# Patient Record
Sex: FEMALE | Race: WHITE | Employment: OTHER | URBAN - METROPOLITAN AREA
[De-identification: names, ages, dates, MRNs, and addresses within clinical notes are randomized per-mention and may not be internally consistent; named-entity substitution may affect disease eponyms.]

---

## 2017-02-16 PROBLEM — Z79.01 CHRONIC ANTICOAGULATION: Status: ACTIVE | Noted: 2017-02-16

## 2017-02-16 PROBLEM — I48.0 PAF (PAROXYSMAL ATRIAL FIBRILLATION) (HCC): Status: ACTIVE | Noted: 2017-02-16

## 2017-02-16 PROBLEM — R55 VASOVAGAL SYNCOPE: Status: ACTIVE | Noted: 2017-02-16

## 2017-05-31 PROBLEM — M70.50 PES ANSERINE BURSITIS: Status: ACTIVE | Noted: 2017-05-31

## 2018-04-16 RX ORDER — ATENOLOL 25 MG/1
25 TABLET ORAL DAILY
Qty: 90 TABLET | Refills: 3 | Status: SHIPPED | OUTPATIENT
Start: 2018-04-16 | End: 2019-04-11 | Stop reason: SDUPTHER

## 2018-07-16 ENCOUNTER — OFFICE VISIT (OUTPATIENT)
Dept: CARDIOLOGY CLINIC | Age: 73
End: 2018-07-16
Payer: MEDICARE

## 2018-07-16 VITALS
SYSTOLIC BLOOD PRESSURE: 124 MMHG | RESPIRATION RATE: 16 BRPM | DIASTOLIC BLOOD PRESSURE: 82 MMHG | BODY MASS INDEX: 29.84 KG/M2 | HEART RATE: 71 BPM | WEIGHT: 152 LBS | HEIGHT: 60 IN

## 2018-07-16 DIAGNOSIS — I45.10 RBBB: Primary | ICD-10-CM

## 2018-07-16 DIAGNOSIS — I48.0 PAROXYSMAL ATRIAL FIBRILLATION (HCC): ICD-10-CM

## 2018-07-16 PROCEDURE — 1090F PRES/ABSN URINE INCON ASSESS: CPT | Performed by: INTERNAL MEDICINE

## 2018-07-16 PROCEDURE — 99213 OFFICE O/P EST LOW 20 MIN: CPT | Performed by: INTERNAL MEDICINE

## 2018-07-16 PROCEDURE — 93000 ELECTROCARDIOGRAM COMPLETE: CPT | Performed by: INTERNAL MEDICINE

## 2018-07-16 PROCEDURE — 4040F PNEUMOC VAC/ADMIN/RCVD: CPT | Performed by: INTERNAL MEDICINE

## 2018-07-16 PROCEDURE — 1036F TOBACCO NON-USER: CPT | Performed by: INTERNAL MEDICINE

## 2018-07-16 PROCEDURE — G8400 PT W/DXA NO RESULTS DOC: HCPCS | Performed by: INTERNAL MEDICINE

## 2018-07-16 PROCEDURE — 3017F COLORECTAL CA SCREEN DOC REV: CPT | Performed by: INTERNAL MEDICINE

## 2018-07-16 PROCEDURE — 1101F PT FALLS ASSESS-DOCD LE1/YR: CPT | Performed by: INTERNAL MEDICINE

## 2018-07-16 PROCEDURE — G8427 DOCREV CUR MEDS BY ELIG CLIN: HCPCS | Performed by: INTERNAL MEDICINE

## 2018-07-16 PROCEDURE — 1123F ACP DISCUSS/DSCN MKR DOCD: CPT | Performed by: INTERNAL MEDICINE

## 2018-07-16 PROCEDURE — G8419 CALC BMI OUT NRM PARAM NOF/U: HCPCS | Performed by: INTERNAL MEDICINE

## 2018-07-16 NOTE — PROGRESS NOTES
[facility]  Matty Andrew M.D., MANDY Marie. Carmelo Duke M.D., MANDY Wolfe M.D. Thanh Celaya M.D. Elvira Carrasquillo M.D. Veronica Dance, D.O. Chevis Lolling, M.D.  Jeannine Garcia. Bernard Turner Miquel   1945  Pema Andrews MD      Johnny Lafleur was seen in our [facility] office today, 2018, for follow up of her paroxysmal atrial fibrillation. She is a 57-year-old woman who does have a history of paroxysmal supraventricular tachycardia and RBBB. While visiting in [place] in 10/2016, she apparently had bronchitis and developed a very serious cough. She was hospitalized and found to be in atrial fibrillation with a rapid ventricular response. She converted to sinus rhythm and was anticoagulated. The cough eventually did resolve. Recently she has been clinically stable. She denies any chest pain or palpitations. She denies lightheadedness or syncope. The patient has no cardiac complaints at this time. She states that she does fatigue easily but believes that it is related to her age. Past Medical History:  1. Palpitations and PSVT. 2. RBBB. 3. Hypothyroidism. 4. Hysterectomy. 5. Ovarian cyst surgery, .  6. Varicose veins  7. Liposuction of upper arms and calves bilaterally. 8. Strong family history of AHD. Both parents  of MI in their 62s. 9. Hyperlipidemia. 10. Stress echo, . Normal.  11. Exercise MPS, 10/04/2007. Augusto protocol, seven minutes 45 seconds, 87% MPHR. No chest pain. Normal perfusion, ED 78%. No TID. No ischemia. 12. Exercise MPS, 2012.  10.4 METS, 88% MPHR, no TID, EF 83%. Small area distal anteroseptal and apical ischemia along with small to moderate sized area of inferolateral ischemia suggestive of two vessel CAD and intermediate risk for hard cardiac events. 13. Lifetime nonsmoker. 14. No drug allergies.   She has been intolerant to high dose aspirin. 15. Obesity. BMI 31.5 - 11/2016. 16. Exercise MPS, 12/18/2013. No chest pain, 7 METS, 86% MPHR. Baseline RBBB with 1 mm upsloping inferior ST depression borderline for ischemia. Isolated PACs. Normal wall motion, EF 84%. Normal perfusion, no ischemia, infarction or TID. 16. Admission [facility] in [place], 10/18/2016 with palpitations. AF/RVR. Rx with IV Cardizem and Lovenox, and discharged with rate control on Eliquis and higher doses of atenolol. 18. OP cardiac follow up, 11/09/2016. Patient stopped Eliquis without medical advice. 19. Labs 11/09/2016. TSH 3.4. Free T4 7.0. PRO-. The PRO-BNP is not consistent with decompensated CHF based on age-adjusted normals and a 90% cut value. 20. OP follow up, 11/23/2016. Patient in sinus rhythm. 21. Hospitalization 02/16/2017. Vasovagal syncope. Review of Systems:  HEENT: negative for acute visual and auditory problems  Constitutional: negative for fever and chills  Respiratory: negative for cough and hemoptysis  Cardiovascular: negative for chest pain and dyspnea  Gastrointestinal: negative for abdominal pain, diarrhea, nausea and vomiting  Genitourinary: negative for dysuria and hematuria  Derm: negative for rash and skin lesion(s)  Neurological: negative for seizures and tremors  Endocrine: negative for diabetic symptoms including polydipsia and polyuria  Musculoskeletal: negative for CTD  Psychiatric: negative for anxiety and major depression. The remainder of the review of systems is negative except as noted above. On exam today, she is a well-nourished white female who is awake, alert and oriented. She looks younger than her stated age. Her pulse is 71 and regular. Her blood pressure is 124/82. She weighs 152 pounds. BMI is 29.7. HEENT:  Normocephalic and atraumatic. EOMI. Sclerae are clear. Pupils are equal, round and react to light. The oral mucosa is moist.  Tongue is midline.   Her neck is supple. She has no jugular distention. Her carotids are full without bruits. She has no neck or supraclavicular masses. No thyromegaly. Respirations are unlabored. Her chest is clear to auscultation and percussion. She has no presacral edema and no chest wall tenderness. Her heart has a regular rhythm with an S4 gallop, but no S3 or murmur. The PMI is not displaced. She has no precordial heave, lift or thrill. Her abdomen is soft and normally active without masses, organomegaly or bruits. Her extremities show no edema. She does have \"heavy ankles\" but there is no pitting. Pedal pulses are easily palpated in the feet bilaterally. There are no obvious skin rashes. I personally reviewed her electrocardiogram.  It showed sinus rhythm and a RBBB, unchanged from 03/03/2017. Despite her initial reluctance to take anticoagulants, she has been on apixaban (Eliquis) for some time now and tells me that she does take is faithfully twice a day. At the end of the visit, her medications were:  atenolol (TENORMIN) 25 MG tablet Take 1 tablet by mouth daily   apixaban (ELIQUIS) 2.5 MG TABS tablet Take 1 tablet by mouth 2 times daily   calcium carbonate 600 MG TABS tablet Take 1 tablet by mouth daily   diclofenac (VOLTAREN) 50 MG EC tablet Take 50 mg by mouth 3 times daily as needed for Pain (inflammation)   temazepam (RESTORIL) 15 MG capsule Take by mouth 1/2 tablet nightly   levothyroxine (SYNTHROID) 88 MCG tablet Take 88 mcg by mouth Daily. As long as she is doing well, I am not making any changes in her management and no further cardiac workup is indicated at this time. We will continue to see her yearly but would be happy to see her sooner if there is a change in her status. I thank you, Dr. Reed Paulino, for asking our advice regarding her care.         DAB/aed  [id number]

## 2018-07-24 ENCOUNTER — OFFICE VISIT (OUTPATIENT)
Dept: ORTHOPEDIC SURGERY | Age: 73
End: 2018-07-24
Payer: MEDICARE

## 2018-07-24 VITALS
DIASTOLIC BLOOD PRESSURE: 80 MMHG | WEIGHT: 145 LBS | HEART RATE: 66 BPM | HEIGHT: 60 IN | TEMPERATURE: 97.6 F | SYSTOLIC BLOOD PRESSURE: 129 MMHG | BODY MASS INDEX: 28.47 KG/M2

## 2018-07-24 DIAGNOSIS — M25.562 CHRONIC PAIN OF BOTH KNEES: Primary | ICD-10-CM

## 2018-07-24 DIAGNOSIS — M25.561 CHRONIC PAIN OF BOTH KNEES: Primary | ICD-10-CM

## 2018-07-24 DIAGNOSIS — G89.29 CHRONIC PAIN OF BOTH KNEES: Primary | ICD-10-CM

## 2018-07-24 DIAGNOSIS — M70.50 PES ANSERINE BURSITIS: ICD-10-CM

## 2018-07-24 PROCEDURE — 3017F COLORECTAL CA SCREEN DOC REV: CPT | Performed by: ORTHOPAEDIC SURGERY

## 2018-07-24 PROCEDURE — G8427 DOCREV CUR MEDS BY ELIG CLIN: HCPCS | Performed by: ORTHOPAEDIC SURGERY

## 2018-07-24 PROCEDURE — 1090F PRES/ABSN URINE INCON ASSESS: CPT | Performed by: ORTHOPAEDIC SURGERY

## 2018-07-24 PROCEDURE — 99213 OFFICE O/P EST LOW 20 MIN: CPT | Performed by: ORTHOPAEDIC SURGERY

## 2018-07-24 PROCEDURE — G8419 CALC BMI OUT NRM PARAM NOF/U: HCPCS | Performed by: ORTHOPAEDIC SURGERY

## 2018-07-24 PROCEDURE — 1123F ACP DISCUSS/DSCN MKR DOCD: CPT | Performed by: ORTHOPAEDIC SURGERY

## 2018-07-24 PROCEDURE — 4040F PNEUMOC VAC/ADMIN/RCVD: CPT | Performed by: ORTHOPAEDIC SURGERY

## 2018-07-24 PROCEDURE — G8400 PT W/DXA NO RESULTS DOC: HCPCS | Performed by: ORTHOPAEDIC SURGERY

## 2018-07-24 PROCEDURE — 1036F TOBACCO NON-USER: CPT | Performed by: ORTHOPAEDIC SURGERY

## 2018-07-24 PROCEDURE — 1101F PT FALLS ASSESS-DOCD LE1/YR: CPT | Performed by: ORTHOPAEDIC SURGERY

## 2019-04-11 RX ORDER — ATENOLOL 25 MG/1
25 TABLET ORAL DAILY
Qty: 90 TABLET | Refills: 3 | Status: SHIPPED
Start: 2019-04-11 | End: 2020-03-13 | Stop reason: SDUPTHER

## 2019-08-12 ENCOUNTER — OFFICE VISIT (OUTPATIENT)
Dept: CARDIOLOGY CLINIC | Age: 74
End: 2019-08-12
Payer: MEDICARE

## 2019-08-12 VITALS
BODY MASS INDEX: 29.35 KG/M2 | HEIGHT: 60 IN | HEART RATE: 70 BPM | DIASTOLIC BLOOD PRESSURE: 78 MMHG | RESPIRATION RATE: 16 BRPM | WEIGHT: 149.5 LBS | SYSTOLIC BLOOD PRESSURE: 128 MMHG

## 2019-08-12 DIAGNOSIS — I48.0 PAROXYSMAL ATRIAL FIBRILLATION (HCC): Primary | ICD-10-CM

## 2019-08-12 PROCEDURE — G8427 DOCREV CUR MEDS BY ELIG CLIN: HCPCS | Performed by: INTERNAL MEDICINE

## 2019-08-12 PROCEDURE — 1036F TOBACCO NON-USER: CPT | Performed by: INTERNAL MEDICINE

## 2019-08-12 PROCEDURE — 93000 ELECTROCARDIOGRAM COMPLETE: CPT | Performed by: INTERNAL MEDICINE

## 2019-08-12 PROCEDURE — 99214 OFFICE O/P EST MOD 30 MIN: CPT | Performed by: INTERNAL MEDICINE

## 2019-08-12 PROCEDURE — 4040F PNEUMOC VAC/ADMIN/RCVD: CPT | Performed by: INTERNAL MEDICINE

## 2019-08-12 PROCEDURE — 1090F PRES/ABSN URINE INCON ASSESS: CPT | Performed by: INTERNAL MEDICINE

## 2019-08-12 PROCEDURE — G8419 CALC BMI OUT NRM PARAM NOF/U: HCPCS | Performed by: INTERNAL MEDICINE

## 2019-08-12 PROCEDURE — G8400 PT W/DXA NO RESULTS DOC: HCPCS | Performed by: INTERNAL MEDICINE

## 2019-08-12 PROCEDURE — 1123F ACP DISCUSS/DSCN MKR DOCD: CPT | Performed by: INTERNAL MEDICINE

## 2019-08-12 NOTE — PROGRESS NOTES
Peace Burnsivette  1945  Date of Service: 8/12/2019    Patient Active Problem List    Diagnosis Date Noted    Abnormal nuclear stress test 12/13/2013     Priority: Medium    History of PSVT (paroxysmal supraventricular tachycardia)      Priority: Medium    Hyperlipidemia      Priority: Medium    Hypothyroidism 12/13/2013     Priority: Low    Family history of premature CAD 12/13/2013     Priority: Low     Overview Note:     1. Both parents had MI in their 63's      Obesity (BMI 30.0-34.9) 12/13/2013     Priority: Low    RBBB      Priority: Low    Pes anserine bursitis 05/31/2017    Paroxysmal atrial fibrillation ([facility] [address] 02/16/2017    Vasovagal syncope 02/16/2017    Chronic anticoagulation 02/16/2017       Social History     Socioeconomic History    Marital status:       Spouse name: None    Number of children: None    Years of education: None    Highest education level: None   Occupational History    None   Social Needs    Financial resource strain: None    Food insecurity:     Worry: None     Inability: None    Transportation needs:     Medical: None     Non-medical: None   Tobacco Use    Smoking status: Never Smoker    Smokeless tobacco: Never Used   Substance and Sexual Activity    Alcohol use: No    Drug use: No    Sexual activity: None   Lifestyle    Physical activity:     Days per week: None     Minutes per session: None    Stress: None   Relationships    Social connections:     Talks on phone: None     Gets together: None     Attends Yazidi service: None     Active member of club or organization: None     Attends meetings of clubs or organizations: None     Relationship status: None    Intimate partner violence:     Fear of current or ex partner: None     Emotionally abused: None     Physically abused: None     Forced sexual activity: None   Other Topics Concern    None   Social History Narrative    None       Current Outpatient Medications   Medication Sig murmur. LUNGS:  Clear to auscultation bilaterally. No use of accessory muscles. symmetrical excursion. ABDOMEN:  Soft, non-tender. Normal bowel sounds. EXTREMITIES:  Full ROM x 4. Very thick lower extremities with mild to moderate bilateral lower extremity edema. Good distal pulses. EYES:  Extraocular muscles intact. PERRL. Normal lids & conjunctiva. ENT:  Nares are clear & not bleeding. Moist mucosa. Normal lips formation. No external masses   NEURO: no tremors, full ROM x 4, EOMI. SKIN:  Warm, dry and intact. Normal turgor. EKG: Sinus rhythm, 70 bpm, nl axis, nonspecific ST - T wave changes. Assessment:   1. Paroxysmal atrial fibrillation. maintaining sinus rhythm at this time. 2. Also reported prior history of PSVT. 3. Chronic right bundle branch block. 4. Chronic lower extremity swelling and edema. Possible lymphedema but she states that she has never been diagnosed with this. 5. Hypercholesterolemia        Recommendations:  1. Continue the atenolol  2. She is less than [de-identified]years old, greater than 60 kg. Her last creatinine that I have available is normal.  Given all of these she should be on full dose Eliquis. Therefore I will increase her from 2.5 mg that she has been taking up to 5 mg twice daily. She denies any falls or bleeding issues. Thank you for allowing me to participate in your patient's care.       [address], [address] [facility]  Interventional Cardiology

## 2020-02-17 ENCOUNTER — TELEPHONE (OUTPATIENT)
Dept: CARDIOLOGY CLINIC | Age: 75
End: 2020-02-17

## 2020-03-13 RX ORDER — ATENOLOL 25 MG/1
25 TABLET ORAL DAILY
Qty: 90 TABLET | Refills: 3 | Status: SHIPPED
Start: 2020-03-13 | End: 2021-03-22

## 2020-07-23 ENCOUNTER — OFFICE VISIT (OUTPATIENT)
Dept: ORTHOPEDIC SURGERY | Age: 75
End: 2020-07-23
Payer: MEDICARE

## 2020-07-23 VITALS — BODY MASS INDEX: 29.25 KG/M2 | TEMPERATURE: 98.1 F | HEIGHT: 60 IN | WEIGHT: 149 LBS

## 2020-07-23 PROCEDURE — 1090F PRES/ABSN URINE INCON ASSESS: CPT | Performed by: ORTHOPAEDIC SURGERY

## 2020-07-23 PROCEDURE — 1123F ACP DISCUSS/DSCN MKR DOCD: CPT | Performed by: ORTHOPAEDIC SURGERY

## 2020-07-23 PROCEDURE — G8417 CALC BMI ABV UP PARAM F/U: HCPCS | Performed by: ORTHOPAEDIC SURGERY

## 2020-07-23 PROCEDURE — 99213 OFFICE O/P EST LOW 20 MIN: CPT | Performed by: ORTHOPAEDIC SURGERY

## 2020-07-23 PROCEDURE — 1036F TOBACCO NON-USER: CPT | Performed by: ORTHOPAEDIC SURGERY

## 2020-07-23 PROCEDURE — G8427 DOCREV CUR MEDS BY ELIG CLIN: HCPCS | Performed by: ORTHOPAEDIC SURGERY

## 2020-07-23 PROCEDURE — 4040F PNEUMOC VAC/ADMIN/RCVD: CPT | Performed by: ORTHOPAEDIC SURGERY

## 2020-07-23 PROCEDURE — G8400 PT W/DXA NO RESULTS DOC: HCPCS | Performed by: ORTHOPAEDIC SURGERY

## 2020-07-23 RX ORDER — TEMAZEPAM 7.5 MG/1
7.5 CAPSULE ORAL NIGHTLY PRN
COMMUNITY
Start: 2020-06-12 | End: 2022-05-12

## 2020-07-24 NOTE — PROGRESS NOTES
Chief Complaint:   Chief Complaint   Patient presents with    Foot Problem     Bunion Lt foot. Hx right bunionectomy 2010       Reese Pace presents with pain and deformity of the left foot, no history of trauma, has a known bunion deformity which she feels has progressed, now interfering with shoe wear and weightbearing activities, giving her a feeling of imbalance. History notable for previous right forefoot corrective surgery in the remote past including hallux valgus correction as well as some correction of the lesser toes according to the patient. Doing well in that regard and pleased with that result. No other joint complaints at this time. Allergies; medications; past medical, surgical, family, and social history; and problem list have been reviewed today and updated as indicated in this encounter seen below. Exam: Lower extremity exam shows good motion of both hips without pain, knees are straight and stable without laxity deformity or effusion. Right foot shows mild persistent hallux valgus deformity that is asymptomatic, lesser toes are well aligned, there is a hard callus beneath the second metatarsal head that is nontender nonerythematous. Left foot shows a severe but passively correctable hallux valgus deformity, also correctable valgus alignment of the lesser toes at the MP levels. Also a hard callus which is nontender beneath the left second metatarsal head. Radiographs: Three-view x-rays of left foot obtained today, there is significant hallux valgus deformity and angle of over 50 degrees, intermetatarsal angle between 1 and 2 is 15 degrees, mild degenerative changes noted at the first MTP. Daisy Lewis was seen today for foot problem.     Diagnoses and all orders for this visit:    Bunion of left foot  -     XR FOOT LEFT (MIN 3 VIEWS)  -     Elvira Macias MD, Orthopaedics, [place] ([facility])       Treatment options were reviewed, the patient is having significant problems that are not addressed with activity modification or shoewear, she has done well with previous forefoot reconstructive surgery on the right. I advised her that surgery of this magnitude is beyond the scope of my practice and therefore suggested consultation with Dr. Ivis Lauren in this regard for possible surgical invention. Questions asked and answered follow-up as needed. - Counseling More Than 50% of the Appointment Time: 15 minutes    Return if symptoms worsen or fail to improve. Current Outpatient Medications   Medication Sig Dispense Refill    temazepam (RESTORIL) 7.5 MG capsule Take 7.5 mg by mouth nightly as needed.  atenolol (TENORMIN) 25 MG tablet Take 1 tablet by mouth daily 90 tablet 3    apixaban (ELIQUIS) 5 MG TABS tablet Take 1 tablet by mouth 2 times daily 180 tablet 3    calcium carbonate 600 MG TABS tablet Take 1 tablet by mouth daily      levothyroxine (SYNTHROID) 88 MCG tablet Take 88 mcg by mouth Daily.  diclofenac (VOLTAREN) 50 MG EC tablet Take 50 mg by mouth 3 times daily as needed for Pain (inflammation)       No current facility-administered medications for this visit. Patient Active Problem List   Diagnosis    History of PSVT (paroxysmal supraventricular tachycardia)    RBBB    Hyperlipidemia    Hypothyroidism    Family history of premature CAD    Abnormal nuclear stress test    Obesity (BMI 30.0-34. 9)    Paroxysmal atrial fibrillation (HCC)    Vasovagal syncope    Chronic anticoagulation    Pes anserine bursitis       Past Medical History:   Diagnosis Date    Family history of coronary artery disease     History of PSVT (paroxysmal supraventricular tachycardia)     Hyperlipidemia     Hypothyroidism     RBBB        Past Surgical History:   Procedure Laterality Date    CHOLECYSTECTOMY      COLONOSCOPY  12/2014    [address],     CYST REMOVAL  1995    HYSTERECTOMY      KNEE ARTHROSCOPY Left 05/08/2015    Arthroscopic medial and lateral meniscectomies, medial chondroplasty BARTOLO Morales MD    LIPOSUCTION      upper arms & calves bilaterally    TONSILLECTOMY         No Known Allergies    Social History     Socioeconomic History    Marital status:      Spouse name: None    Number of children: None    Years of education: None    Highest education level: None   Occupational History    None   Social Needs    Financial resource strain: None    Food insecurity     Worry: None     Inability: None    Transportation needs     Medical: None     Non-medical: None   Tobacco Use    Smoking status: Never Smoker    Smokeless tobacco: Never Used   Substance and Sexual Activity    Alcohol use: No    Drug use: No    Sexual activity: None   Lifestyle    Physical activity     Days per week: None     Minutes per session: None    Stress: None   Relationships    Social connections     Talks on phone: None     Gets together: None     Attends Anabaptist service: None     Active member of club or organization: None     Attends meetings of clubs or organizations: None     Relationship status: None    Intimate partner violence     Fear of current or ex partner: None     Emotionally abused: None     Physically abused: None     Forced sexual activity: None   Other Topics Concern    None   Social History Narrative    None       Family History   Problem Relation Age of Onset    Heart Surgery Mother     Heart Surgery Father          Review of Systems  As follows except as previously noted in HPI:  Constitutional: Negative for chills, diaphoresis, fatigue, fever and unexpected weight change. Respiratory: Negative for cough, shortness of breath and wheezing. Cardiovascular: Negative for chest pain and palpitations. Neurological: Negative for dizziness, syncope, cephalgia. GI / : negative  Musculoskeletal: see HPI       Objective:   Physical Exam   Constitutional: Oriented to person, place, and time.  and appears well-developed and well-nourished. :   Head: Normocephalic and atraumatic. Eyes: EOM are normal.   Neck: Neck supple. Cardiovascular: Normal rate and regular rhythm. Pulmonary/Chest: Effort normal. No stridor. No respiratory distress, no wheezes. Abdominal:  No abnormal distension. Neurological: Alert and oriented to person, place, and time. Skin: Skin is warm and dry. Psychiatric: Normal mood and affect.  Behavior is normal. Thought content normal.    7/24/2020  10:51 AM

## 2020-10-20 ENCOUNTER — OFFICE VISIT (OUTPATIENT)
Dept: CARDIOLOGY CLINIC | Age: 75
End: 2020-10-20
Payer: MEDICARE

## 2020-10-20 VITALS
RESPIRATION RATE: 16 BRPM | HEIGHT: 60 IN | DIASTOLIC BLOOD PRESSURE: 72 MMHG | BODY MASS INDEX: 30.57 KG/M2 | WEIGHT: 155.7 LBS | SYSTOLIC BLOOD PRESSURE: 120 MMHG | HEART RATE: 83 BPM

## 2020-10-20 PROCEDURE — 1123F ACP DISCUSS/DSCN MKR DOCD: CPT | Performed by: INTERNAL MEDICINE

## 2020-10-20 PROCEDURE — 1036F TOBACCO NON-USER: CPT | Performed by: INTERNAL MEDICINE

## 2020-10-20 PROCEDURE — G8400 PT W/DXA NO RESULTS DOC: HCPCS | Performed by: INTERNAL MEDICINE

## 2020-10-20 PROCEDURE — 93000 ELECTROCARDIOGRAM COMPLETE: CPT | Performed by: INTERNAL MEDICINE

## 2020-10-20 PROCEDURE — 1090F PRES/ABSN URINE INCON ASSESS: CPT | Performed by: INTERNAL MEDICINE

## 2020-10-20 PROCEDURE — 99213 OFFICE O/P EST LOW 20 MIN: CPT | Performed by: INTERNAL MEDICINE

## 2020-10-20 PROCEDURE — G8484 FLU IMMUNIZE NO ADMIN: HCPCS | Performed by: INTERNAL MEDICINE

## 2020-10-20 PROCEDURE — G8427 DOCREV CUR MEDS BY ELIG CLIN: HCPCS | Performed by: INTERNAL MEDICINE

## 2020-10-20 PROCEDURE — G8417 CALC BMI ABV UP PARAM F/U: HCPCS | Performed by: INTERNAL MEDICINE

## 2020-10-20 PROCEDURE — 4040F PNEUMOC VAC/ADMIN/RCVD: CPT | Performed by: INTERNAL MEDICINE

## 2020-10-20 NOTE — PROGRESS NOTES
Marc Blinks  1945  Date of Service: 10/20/2020    Patient Active Problem List    Diagnosis Date Noted    Abnormal nuclear stress test 12/13/2013     Priority: Medium    History of PSVT (paroxysmal supraventricular tachycardia)      Priority: Medium    Hyperlipidemia      Priority: Medium    Hypothyroidism 12/13/2013     Priority: Low    Family history of premature CAD 12/13/2013     Priority: Low     Overview Note:     1. Both parents had MI in their 63's      Obesity (BMI 30.0-34.9) 12/13/2013     Priority: Low    RBBB      Priority: Low    Pes anserine bursitis 05/31/2017    Paroxysmal atrial fibrillation ([facility] [address] 02/16/2017    Vasovagal syncope 02/16/2017    Chronic anticoagulation 02/16/2017       Social History     Socioeconomic History    Marital status:       Spouse name: None    Number of children: None    Years of education: None    Highest education level: None   Occupational History    None   Social Needs    Financial resource strain: None    Food insecurity     Worry: None     Inability: None    Transportation needs     Medical: None     Non-medical: None   Tobacco Use    Smoking status: Never Smoker    Smokeless tobacco: Never Used   Substance and Sexual Activity    Alcohol use: No    Drug use: No    Sexual activity: None   Lifestyle    Physical activity     Days per week: None     Minutes per session: None    Stress: None   Relationships    Social connections     Talks on phone: None     Gets together: None     Attends Amish service: None     Active member of club or organization: None     Attends meetings of clubs or organizations: None     Relationship status: None    Intimate partner violence     Fear of current or ex partner: None     Emotionally abused: None     Physically abused: None     Forced sexual activity: None   Other Topics Concern    None   Social History Narrative    None       Current Outpatient Medications   Medication Sig Dispense Refill    temazepam (RESTORIL) 7.5 MG capsule Take 7.5 mg by mouth nightly as needed.  atenolol (TENORMIN) 25 MG tablet Take 1 tablet by mouth daily 90 tablet 3    apixaban (ELIQUIS) 5 MG TABS tablet Take 1 tablet by mouth 2 times daily 180 tablet 3    calcium carbonate 600 MG TABS tablet Take 1 tablet by mouth daily      diclofenac (VOLTAREN) 50 MG EC tablet Take 50 mg by mouth 3 times daily as needed for Pain (inflammation)      levothyroxine (SYNTHROID) 88 MCG tablet Take 88 mcg by mouth Daily. No current facility-administered medications for this visit. No Known Allergies    Chief Complaint:  Marquis Lambert is here today for follow up and management/recomendations for PAF     History of Present Illness: Marquis Lambert states that She does house work, goes up the stairs, & goes shopping. She denies any chest discomfort, dyspnea on exertion, orthopnea/PND. She states that she has chronic lower extremity edema/swelling thickness that is unchanged. She denies any palpitations or presyncopal symptoms. REVIEW OF SYSTEMS:  As above. Patient does not complain of any fever, chills, nausea, vomiting or diarrhea. No focal, motor or neurological deficits. No changes in his/her vision, hearing, bowel or bladder habits. She is not known to have a history of thyroid problems. No recent nose bleeds. PHYSICAL EXAM:  Vitals:    10/20/20 1343   BP: 120/72   Pulse: 83   Resp: 16   Weight: 155 lb 11.2 oz (70.6 kg)   Height: 5' (1.524 m)       GENERAL:  She is alert and oriented x 3, communicates well, in no distress. NECK:  No masses, trachea is mid position. Supple, full ROM, no JVD or bruits. No palpable thyromegaly or lymphadenopathy. HEART:  Regular rate and rhythm. Normal S1 and S2. There is an S4 gallop and a I/VI (normal physiologic) systolic murmur. LUNGS:  Clear to auscultation bilaterally. No use of accessory muscles. symmetrical excursion. Poor effort.   ABDOMEN:  Soft, non-tender. Normal bowel sounds. Obese. EXTREMITIES:  Full ROM x 4. Very thick lower extremities but no bilateral lower extremity pitting edema. Good distal pulses. EYES:  Extraocular muscles intact. PERRL. Normal lids & conjunctiva. ENT:  Nares are clear & not bleeding. Moist mucosa. Normal lips formation. No external masses   NEURO: no tremors, full ROM x 4, EOMI. SKIN:  Warm, dry and intact. Normal turgor. EKG: Sinus rhythm, 83 bpm, nl axis, nonspecific ST - T wave changes. Occasional PACs. Assessment:   1. Paroxysmal atrial fibrillation. maintaining sinus rhythm with PACs today. 2. Also reported prior history of PSVT. 3. Chronic right bundle branch block. 4. Chronic lower extremity swelling and edema. Possible lymphedema but she states that she has never been diagnosed with this. 5. Hypercholesterolemia        Recommendations:  1. Continue the atenolol  2. She states that she is not taking the Eliquis as prescribed. She is taking 1 in the morning and half in the evening because one time she scratched herself with her fingernail and it bled a lot. I had a long discussion with her that she will bleed and bruise more on oral anticoagulation. However, if she is not taking the full dose she is not receiving full protection from a CVA. She states that she understands. Thank you for allowing me to participate in your patient's care.       [address], [address] [facility]  Interventional Cardiology

## 2021-02-13 ENCOUNTER — IMMUNIZATION (OUTPATIENT)
Dept: PRIMARY CARE CLINIC | Age: 76
End: 2021-02-13
Payer: MEDICARE

## 2021-02-13 PROCEDURE — 91300 COVID-19, PFIZER VACCINE 30MCG/0.3ML DOSE: CPT | Performed by: INTERNAL MEDICINE

## 2021-02-13 PROCEDURE — 0001A COVID-19, PFIZER VACCINE 30MCG/0.3ML DOSE: CPT | Performed by: INTERNAL MEDICINE

## 2021-02-16 RX ORDER — APIXABAN 5 MG/1
TABLET, FILM COATED ORAL
Qty: 180 TABLET | Refills: 3 | Status: SHIPPED
Start: 2021-02-16 | End: 2022-02-11

## 2021-03-08 ENCOUNTER — IMMUNIZATION (OUTPATIENT)
Dept: PRIMARY CARE CLINIC | Age: 76
End: 2021-03-08
Payer: MEDICARE

## 2021-03-08 PROCEDURE — 91300 COVID-19, PFIZER VACCINE 30MCG/0.3ML DOSE: CPT | Performed by: NURSE PRACTITIONER

## 2021-03-08 PROCEDURE — 0002A COVID-19, PFIZER VACCINE 30MCG/0.3ML DOSE: CPT | Performed by: NURSE PRACTITIONER

## 2021-03-22 RX ORDER — ATENOLOL 25 MG/1
TABLET ORAL
Qty: 90 TABLET | Refills: 3 | Status: SHIPPED
Start: 2021-03-22 | End: 2021-09-13 | Stop reason: DRUGHIGH

## 2021-03-22 RX ORDER — ATENOLOL 25 MG/1
25 TABLET ORAL DAILY
Qty: 10 TABLET | Refills: 0 | Status: SHIPPED
Start: 2021-03-22 | End: 2021-09-13 | Stop reason: DRUGHIGH

## 2021-09-13 ENCOUNTER — TELEPHONE (OUTPATIENT)
Dept: CARDIOLOGY CLINIC | Age: 76
End: 2021-09-13

## 2021-09-13 RX ORDER — ATENOLOL 50 MG/1
50 TABLET ORAL DAILY
COMMUNITY
End: 2021-11-16 | Stop reason: SDUPTHER

## 2021-09-13 NOTE — TELEPHONE ENCOUNTER
Patient called stating she saw Dr. Bran Kim today and is in AFIB. Dr. Bran Kim advised her to see Dr. Val Schwab. F/U scheduled for 9/15/21. Requested EKG and office note from Dr. Bran Kim' office. HR today 101. Patient takes Atenolol 25 mg daily. Please advise of any changes prior to F/U.

## 2021-09-15 ENCOUNTER — OFFICE VISIT (OUTPATIENT)
Dept: FAMILY MEDICINE CLINIC | Age: 76
End: 2021-09-15
Payer: MEDICARE

## 2021-09-15 VITALS
SYSTOLIC BLOOD PRESSURE: 130 MMHG | HEART RATE: 69 BPM | OXYGEN SATURATION: 99 % | BODY MASS INDEX: 28.07 KG/M2 | HEIGHT: 60 IN | DIASTOLIC BLOOD PRESSURE: 78 MMHG | TEMPERATURE: 97.4 F | RESPIRATION RATE: 16 BRPM | WEIGHT: 143 LBS

## 2021-09-15 DIAGNOSIS — Z20.822 EXPOSURE TO COVID-19 VIRUS: Primary | ICD-10-CM

## 2021-09-15 PROCEDURE — 1123F ACP DISCUSS/DSCN MKR DOCD: CPT | Performed by: PHYSICIAN ASSISTANT

## 2021-09-15 PROCEDURE — 1090F PRES/ABSN URINE INCON ASSESS: CPT | Performed by: PHYSICIAN ASSISTANT

## 2021-09-15 PROCEDURE — G8417 CALC BMI ABV UP PARAM F/U: HCPCS | Performed by: PHYSICIAN ASSISTANT

## 2021-09-15 PROCEDURE — G8427 DOCREV CUR MEDS BY ELIG CLIN: HCPCS | Performed by: PHYSICIAN ASSISTANT

## 2021-09-15 PROCEDURE — 4040F PNEUMOC VAC/ADMIN/RCVD: CPT | Performed by: PHYSICIAN ASSISTANT

## 2021-09-15 PROCEDURE — G8400 PT W/DXA NO RESULTS DOC: HCPCS | Performed by: PHYSICIAN ASSISTANT

## 2021-09-15 PROCEDURE — 99203 OFFICE O/P NEW LOW 30 MIN: CPT | Performed by: PHYSICIAN ASSISTANT

## 2021-09-15 PROCEDURE — 1036F TOBACCO NON-USER: CPT | Performed by: PHYSICIAN ASSISTANT

## 2021-09-15 NOTE — PROGRESS NOTES
9/15/21  Jim Griffith : 1945 Sex: female  Age 64 y.o. Subjective:  Chief Complaint   Patient presents with    Covid Testing     covid exposure no symptoms          HPI:   Jim Griffith , 64 y.o. female presents to [facility] for evaluation of exposure    HPI  10-year-old female presents to [facility] for evaluation of Covid screening. The patient was exposed to a friend who had tested positive for Covid. The patient is currently asymptomatic. The patient is vaccinated. The patient denied fever, chills, lightheadedness, dizziness. Patient is not having any upper respiratory symptoms. ROS:   Unless otherwise stated in this report the patient's positive and negative responses for review of systems for constitutional, eyes, ENT, cardiovascular, respiratory, gastrointestinal, neurological, , musculoskeletal, and integument systems and related systems to the presenting problem are either stated in the history of present illness or were not pertinent or were negative for the symptoms and/or complaints related to the presenting medical problem. Positives and pertinent negatives as per HPI. All others reviewed and are negative. PMH:     Past Medical History:   Diagnosis Date    Family history of coronary artery disease     History of PSVT (paroxysmal supraventricular tachycardia)     Hyperlipidemia     Hypothyroidism     RBBB        Past Surgical History:   Procedure Laterality Date    CHOLECYSTECTOMY      COLONOSCOPY  2014    [facility],     CYST REMOVAL      HYSTERECTOMY      KNEE ARTHROSCOPY Left 2015    Arthroscopic medial and lateral meniscectomies, medial chondroplasty BARTOLO iDana MD    LIPOSUCTION      upper arms & calves bilaterally    TONSILLECTOMY         Family History   Problem Relation Age of Onset    Heart Surgery Mother     Heart Surgery Father        Medications:     Current Outpatient Medications:     atenolol (TENORMIN) 50 MG tablet, Take 50 mg by mouth daily, Disp: , Rfl:     ELIQUIS 5 MG TABS tablet, TAKE 1 TABLET TWICE A DAY, Disp: 180 tablet, Rfl: 3    temazepam (RESTORIL) 7.5 MG capsule, Take 7.5 mg by mouth nightly as needed. , Disp: , Rfl:     calcium carbonate 600 MG TABS tablet, Take 1 tablet by mouth daily, Disp: , Rfl:     diclofenac (VOLTAREN) 50 MG EC tablet, Take 50 mg by mouth 3 times daily as needed for Pain (inflammation), Disp: , Rfl:     levothyroxine (SYNTHROID) 88 MCG tablet, Take 88 mcg by mouth Daily. , Disp: , Rfl:     Allergies:   No Known Allergies    Social History:     Social History     Tobacco Use    Smoking status: Never Smoker    Smokeless tobacco: Never Used   Substance Use Topics    Alcohol use: No    Drug use: No       Patient lives at home. Physical Exam:     Vitals:    09/15/21 0906   BP: 130/78   Pulse: 69   Resp: 16   Temp: 97.4 °F (36.3 °C)   SpO2: 99%   Weight: 143 lb (64.9 kg)   Height: 5' (1.524 m)       Exam:  Physical Exam  Nurse's notes and vital signs reviewed. The patient is not hypoxic. ? General: Alert, no acute distress, patient resting comfortably Patient is not toxic or lethargic. Skin: Warm, intact, no pallor noted. There is no evidence of rash at this time. Head: Normocephalic, atraumatic  Eye: Normal conjunctiva  Ears, Nose, Throat: Right tympanic membrane clear, left tympanic membrane clear. No drainage or discharge noted. No pre- or post-auricular tenderness, erythema, or swelling noted. No rhinorrhea or congestion noted. Posterior oropharynx shows no erythema, tonsillar hypertrophy, or exudate. the uvula is midline. No trismus or drooling is noted. Moist mucous membranes. Neck: No anterior/posterior lymphadenopathy noted. No erythema, no masses, no fluctuance or induration noted. No meningeal signs. Cardiovascular: Regular Rate and Rhythm  Respiratory: No acute distress, no rhonchi, wheezing or crackles noted.  No stridor or retractions are

## 2021-09-20 ENCOUNTER — TELEPHONE (OUTPATIENT)
Dept: CARDIOLOGY CLINIC | Age: 76
End: 2021-09-20

## 2021-09-20 NOTE — TELEPHONE ENCOUNTER
Pt called back to r/s her appt that was canceled d/t possible Covid exposure. Per pt she was tested on 9/15 and informed 9/18 that she was negative for Covid. Pt is now scheduled with Dr Valentina Gilford on 10-01-21. Pt stated she was to be seen sooner for her Afib per Dr Bala Soto. However pt will be out of town this week, she also has another appt on 09-29-21 in the afternoon. She would be willing to see a different provider if needed.   Please review and let pt know how to proceed [phone]

## 2021-10-01 ENCOUNTER — OFFICE VISIT (OUTPATIENT)
Dept: CARDIOLOGY CLINIC | Age: 76
End: 2021-10-01
Payer: MEDICARE

## 2021-10-01 VITALS
RESPIRATION RATE: 16 BRPM | BODY MASS INDEX: 28.88 KG/M2 | DIASTOLIC BLOOD PRESSURE: 78 MMHG | HEIGHT: 60 IN | HEART RATE: 65 BPM | SYSTOLIC BLOOD PRESSURE: 124 MMHG | WEIGHT: 147.1 LBS

## 2021-10-01 DIAGNOSIS — R55 NEAR SYNCOPE: ICD-10-CM

## 2021-10-01 DIAGNOSIS — I48.0 PAROXYSMAL ATRIAL FIBRILLATION (HCC): ICD-10-CM

## 2021-10-01 DIAGNOSIS — Z86.79 HISTORY OF PSVT (PAROXYSMAL SUPRAVENTRICULAR TACHYCARDIA): ICD-10-CM

## 2021-10-01 DIAGNOSIS — E78.5 HYPERLIPIDEMIA, UNSPECIFIED HYPERLIPIDEMIA TYPE: Primary | ICD-10-CM

## 2021-10-01 PROCEDURE — 4040F PNEUMOC VAC/ADMIN/RCVD: CPT | Performed by: INTERNAL MEDICINE

## 2021-10-01 PROCEDURE — 1036F TOBACCO NON-USER: CPT | Performed by: INTERNAL MEDICINE

## 2021-10-01 PROCEDURE — 1090F PRES/ABSN URINE INCON ASSESS: CPT | Performed by: INTERNAL MEDICINE

## 2021-10-01 PROCEDURE — G8417 CALC BMI ABV UP PARAM F/U: HCPCS | Performed by: INTERNAL MEDICINE

## 2021-10-01 PROCEDURE — G8427 DOCREV CUR MEDS BY ELIG CLIN: HCPCS | Performed by: INTERNAL MEDICINE

## 2021-10-01 PROCEDURE — 1123F ACP DISCUSS/DSCN MKR DOCD: CPT | Performed by: INTERNAL MEDICINE

## 2021-10-01 PROCEDURE — 99214 OFFICE O/P EST MOD 30 MIN: CPT | Performed by: INTERNAL MEDICINE

## 2021-10-01 PROCEDURE — G8484 FLU IMMUNIZE NO ADMIN: HCPCS | Performed by: INTERNAL MEDICINE

## 2021-10-01 PROCEDURE — 93000 ELECTROCARDIOGRAM COMPLETE: CPT | Performed by: INTERNAL MEDICINE

## 2021-10-01 PROCEDURE — G8400 PT W/DXA NO RESULTS DOC: HCPCS | Performed by: INTERNAL MEDICINE

## 2021-10-01 RX ORDER — PANTOPRAZOLE SODIUM 40 MG/1
TABLET, DELAYED RELEASE ORAL
COMMUNITY
Start: 2021-02-26 | End: 2021-10-01

## 2021-10-01 NOTE — PROGRESS NOTES
Patient was seen in office today for a 30 day monitor per   Patient tolerated well and understood instruction and device # [id number]

## 2021-10-01 NOTE — PROGRESS NOTES
Nicole Escobar  1945  Date of Service: 10/1/2021    Patient Active Problem List    Diagnosis Date Noted    Abnormal nuclear stress test 12/13/2013     Priority: Medium    History of PSVT (paroxysmal supraventricular tachycardia)      Priority: Medium    Hyperlipidemia      Priority: Medium    Hypothyroidism 12/13/2013     Priority: Low    Family history of premature CAD 12/13/2013     Priority: Low     Overview Note:     1. Both parents had MI in their 63's      Obesity (BMI 30.0-34.9) 12/13/2013     Priority: Low    RBBB      Priority: Low    Pes anserine bursitis 05/31/2017    Paroxysmal atrial fibrillation ([address] 02/16/2017    Vasovagal syncope 02/16/2017    Chronic anticoagulation 02/16/2017       Social History     Socioeconomic History    Marital status:      Spouse name: None    Number of children: None    Years of education: None    Highest education level: None   Occupational History    None   Tobacco Use    Smoking status: Never Smoker    Smokeless tobacco: Never Used   Substance and Sexual Activity    Alcohol use: No    Drug use: No    Sexual activity: None   Other Topics Concern    None   Social History Narrative    None     Social Determinants of Health     Financial Resource Strain:     Difficulty of Paying Living Expenses:    Food Insecurity:     Worried About Running Out of Food in the Last Year:     Ran Out of Food in the Last Year:    Transportation Needs:     Lack of Transportation (Medical):      Lack of Transportation (Non-Medical):    Physical Activity:     Days of Exercise per Week:     Minutes of Exercise per Session:    Stress:     Feeling of Stress :    Social Connections:     Frequency of Communication with Friends and Family:     Frequency of Social Gatherings with Friends and Family:     Attends Quaker Services:     Active Member of Clubs or Organizations:     Attends Club or Organization Meetings:     Marital Status:    Intimate Partner Violence:     Fear of Current or Ex-Partner:     Emotionally Abused:     Physically Abused:     Sexually Abused:        Current Outpatient Medications   Medication Sig Dispense Refill    atenolol (TENORMIN) 50 MG tablet Take 50 mg by mouth 2 times daily       ELIQUIS 5 MG TABS tablet TAKE 1 TABLET TWICE A  tablet 3    temazepam (RESTORIL) 7.5 MG capsule Take 7.5 mg by mouth nightly as needed.  calcium carbonate 600 MG TABS tablet Take 1 tablet by mouth daily      diclofenac (VOLTAREN) 50 MG EC tablet Take 50 mg by mouth 3 times daily as needed for Pain (inflammation)      levothyroxine (SYNTHROID) 88 MCG tablet Take 88 mcg by mouth Daily.  pantoprazole (PROTONIX) 40 MG tablet  (Patient not taking: Reported on 10/1/2021)       No current facility-administered medications for this visit. No Known Allergies    Chief Complaint:  Guillermo Rhodes is here today for follow up and management/recomendations for PAF     History of Present Illness: Guillermo Rhodes states that She does house work, goes up the stairs, & goes shopping. She denies any chest discomfort, dyspnea on exertion, orthopnea/PND. She states that she has chronic lower extremity thickness. She states that occasionally after rushing she will notice that her heart rate is increased. She then states that she has been under a lot of stress at work. For the past 4 months she has been feeling chronically tired and dizzy. She states that she has not been sleeping well so she has really increased the use of her sleeping pills. REVIEW OF SYSTEMS:  As above. Patient does not complain of any fever, chills, nausea, vomiting or diarrhea. No focal, motor or neurological deficits. No changes in his/her vision, hearing, bowel or bladder habits. She is not known to have a history of thyroid problems. No recent nose bleeds.     PHYSICAL EXAM:  Vitals:    10/01/21 0752   BP: 124/78   Pulse: 65   Resp: 16   Weight: 147 lb 1.6 oz (66.7 kg)   Height: 5' (1.524 m)       GENERAL:  She is alert and oriented x 3, communicates well, in no distress. NECK:  No masses, trachea is mid position. Supple, full ROM, no JVD or bruits. No palpable thyromegaly or lymphadenopathy. HEART:  Regular rate and rhythm. Normal S1 and S2. There is an S4 gallop. No abnormal murmur. LUNGS:  Clear to auscultation bilaterally. No use of accessory muscles. symmetrical excursion. Poor effort. ABDOMEN: Obese. Soft, non-tender. Normal bowel sounds. EXTREMITIES:  Full ROM x 4. Very thick but no bilateral lower extremity pitting edema. Good distal pulses. EYES:  Extraocular muscles intact. PERRL. Normal lids & conjunctiva. ENT:  Nares are clear & not bleeding. Moist mucosa. Normal lips formation. No external masses   NEURO: no tremors, full ROM x 4, EOMI. SKIN:  Warm, dry and intact. Normal turgor. EKG: Sinus rhythm, 65 bpm, borderline axis, nonspecific ST - T wave changes. Right bundle branch block. Compared to her prior ECGs the right bundle branch block is unchanged. The axis has shifted slightly leftward. Assessment:   1. Paroxysmal atrial fibrillation. maintaining sinus rhythm with PACs today. 2. Also reported prior history of PSVT. 3. Chronic right bundle branch block. 4. Chronic lower extremity swelling and edema. Possible lymphedema but she states that she has never been diagnosed with this. 5. Hypercholesterolemia  6. Atypical tiredness and dizziness. Recommendations:  1. I performed orthostatics on her myself. Lying her blood pressure was 128/75, sitting 135/75, standing 135/70. She had no symptoms. 2. Echocardiogram  3. 30-day monitor  4. I told her that she needed to discuss the increased use of her sleeping pills with the prescribing physician. This may be the etiology for her feeling tired and dizzy. Thank you for allowing me to participate in your patient's care.       [id number], [facility]  Interventional Cardiology

## 2021-11-02 DIAGNOSIS — Z86.79 HISTORY OF PSVT (PAROXYSMAL SUPRAVENTRICULAR TACHYCARDIA): ICD-10-CM

## 2021-11-02 DIAGNOSIS — R55 NEAR SYNCOPE: ICD-10-CM

## 2021-11-02 DIAGNOSIS — I48.0 PAROXYSMAL ATRIAL FIBRILLATION (HCC): ICD-10-CM

## 2021-11-04 ENCOUNTER — TELEPHONE (OUTPATIENT)
Dept: CARDIOLOGY CLINIC | Age: 76
End: 2021-11-04

## 2021-11-04 NOTE — TELEPHONE ENCOUNTER
----- Message from Renato Joshua DO sent at 11/4/2021 11:46 AM EDT -----  Please let her know that she did not have any dysrhythmias.

## 2021-11-08 ENCOUNTER — HOSPITAL ENCOUNTER (OUTPATIENT)
Dept: CARDIOLOGY | Age: 76
Discharge: HOME | End: 2021-11-08
Payer: MEDICARE

## 2021-11-08 DIAGNOSIS — I48.0 PAROXYSMAL ATRIAL FIBRILLATION (HCC): ICD-10-CM

## 2021-11-08 DIAGNOSIS — R55 NEAR SYNCOPE: ICD-10-CM

## 2021-11-08 DIAGNOSIS — Z86.79 HISTORY OF PSVT (PAROXYSMAL SUPRAVENTRICULAR TACHYCARDIA): ICD-10-CM

## 2021-11-08 LAB
LV EF: 60 %
LVEF MODALITY: NORMAL

## 2021-11-08 PROCEDURE — 93306 TTE W/DOPPLER COMPLETE: CPT

## 2021-11-09 ENCOUNTER — TELEPHONE (OUTPATIENT)
Dept: CARDIOLOGY CLINIC | Age: 76
End: 2021-11-09

## 2021-11-09 DIAGNOSIS — I34.0 NONRHEUMATIC MITRAL VALVE REGURGITATION: Primary | ICD-10-CM

## 2021-11-09 PROBLEM — I35.1 NONRHEUMATIC AORTIC VALVE INSUFFICIENCY: Status: ACTIVE | Noted: 2021-11-09

## 2021-11-09 RX ORDER — LOSARTAN POTASSIUM 25 MG/1
12.5 TABLET ORAL DAILY
Qty: 45 TABLET | Refills: 3 | Status: SHIPPED
Start: 2021-11-09 | End: 2022-01-25 | Stop reason: SDUPTHER

## 2021-11-09 NOTE — TELEPHONE ENCOUNTER
Losartan e-scribed. Order placed for BMP and faxed to Dr. Memo Lowery' office. Patient put on list to call when May 2022 schedule opens.

## 2021-11-16 RX ORDER — ATENOLOL 50 MG/1
50 TABLET ORAL DAILY
Qty: 90 TABLET | Refills: 3 | Status: SHIPPED | OUTPATIENT
Start: 2021-11-16

## 2021-11-23 DIAGNOSIS — I34.0 NONRHEUMATIC MITRAL VALVE REGURGITATION: ICD-10-CM

## 2022-01-25 RX ORDER — LOSARTAN POTASSIUM 25 MG/1
12.5 TABLET ORAL DAILY
Qty: 45 TABLET | Refills: 3 | Status: SHIPPED | OUTPATIENT
Start: 2022-01-25

## 2022-02-11 RX ORDER — APIXABAN 5 MG/1
TABLET, FILM COATED ORAL
Qty: 180 TABLET | Refills: 3 | Status: SHIPPED | OUTPATIENT
Start: 2022-02-11

## 2022-05-12 ENCOUNTER — OFFICE VISIT (OUTPATIENT)
Dept: CARDIOLOGY CLINIC | Age: 77
End: 2022-05-12
Payer: MEDICARE

## 2022-05-12 VITALS
SYSTOLIC BLOOD PRESSURE: 131 MMHG | HEIGHT: 60 IN | WEIGHT: 150.3 LBS | BODY MASS INDEX: 29.51 KG/M2 | DIASTOLIC BLOOD PRESSURE: 74 MMHG | HEART RATE: 77 BPM | RESPIRATION RATE: 14 BRPM

## 2022-05-12 DIAGNOSIS — I48.0 PAROXYSMAL ATRIAL FIBRILLATION (HCC): Primary | ICD-10-CM

## 2022-05-12 PROCEDURE — 93000 ELECTROCARDIOGRAM COMPLETE: CPT | Performed by: INTERNAL MEDICINE

## 2022-05-12 PROCEDURE — 1090F PRES/ABSN URINE INCON ASSESS: CPT | Performed by: INTERNAL MEDICINE

## 2022-05-12 PROCEDURE — 99214 OFFICE O/P EST MOD 30 MIN: CPT | Performed by: INTERNAL MEDICINE

## 2022-05-12 PROCEDURE — 1123F ACP DISCUSS/DSCN MKR DOCD: CPT | Performed by: INTERNAL MEDICINE

## 2022-05-12 PROCEDURE — G8400 PT W/DXA NO RESULTS DOC: HCPCS | Performed by: INTERNAL MEDICINE

## 2022-05-12 PROCEDURE — G8427 DOCREV CUR MEDS BY ELIG CLIN: HCPCS | Performed by: INTERNAL MEDICINE

## 2022-05-12 PROCEDURE — 4040F PNEUMOC VAC/ADMIN/RCVD: CPT | Performed by: INTERNAL MEDICINE

## 2022-05-12 PROCEDURE — G8417 CALC BMI ABV UP PARAM F/U: HCPCS | Performed by: INTERNAL MEDICINE

## 2022-05-12 PROCEDURE — 1036F TOBACCO NON-USER: CPT | Performed by: INTERNAL MEDICINE

## 2022-05-12 NOTE — PROGRESS NOTES
Xiang Meza  1945  Date of Service: 5/12/2022    Patient Active Problem List    Diagnosis Date Noted    Abnormal nuclear stress test 12/13/2013     Priority: Medium    History of PSVT (paroxysmal supraventricular tachycardia)      Priority: Medium    Hyperlipidemia      Priority: Medium    Hypothyroidism 12/13/2013     Priority: Low    Family history of premature CAD 12/13/2013     Priority: Low     Overview Note:     1. Both parents had MI in their 63's      Obesity (BMI 30.0-34.9) 12/13/2013     Priority: Low    RBBB      Priority: Low    Nonrheumatic mitral valve regurgitation 11/09/2021     Overview Note:     Mild to moderate      Nonrheumatic aortic valve insufficiency 11/09/2021     Overview Note:     Moderate      Pes anserine bursitis 05/31/2017    Paroxysmal atrial fibrillation [address] [address] 02/16/2017    Vasovagal syncope 02/16/2017    Chronic anticoagulation 02/16/2017       Social History     Socioeconomic History    Marital status:      Spouse name: Not on file    Number of children: Not on file    Years of education: Not on file    Highest education level: Not on file   Occupational History    Not on file   Tobacco Use    Smoking status: Never Smoker    Smokeless tobacco: Never Used   Substance and Sexual Activity    Alcohol use: No    Drug use: No    Sexual activity: Not on file   Other Topics Concern    Not on file   Social History Narrative    Not on file     Social Determinants of Health     Financial Resource Strain:     Difficulty of Paying Living Expenses: Not on file   Food Insecurity:     Worried About Running Out of Food in the Last Year: Not on file    [place] of Food in the Last Year: Not on file   Transportation Needs:     Lack of Transportation (Medical): Not on file    Lack of Transportation (Non-Medical):  Not on file   Physical Activity:     Days of Exercise per Week: Not on file    Minutes of Exercise per Session: Not on file   Stress:     Feeling of Stress : Not on file   Social Connections:     Frequency of Communication with Friends and Family: Not on file    Frequency of Social Gatherings with Friends and Family: Not on file    Attends Buddhist Services: Not on file    Active Member of Clubs or Organizations: Not on file    Attends Club or Organization Meetings: Not on file    Marital Status: Not on file   Intimate Partner Violence:     Fear of Current or Ex-Partner: Not on file    Emotionally Abused: Not on file    Physically Abused: Not on file    Sexually Abused: Not on file   Housing Stability:     Unable to Pay for Housing in the Last Year: Not on file    Number of [place] in the Last Year: Not on file    Unstable Housing in the Last Year: Not on file       Current Outpatient Medications   Medication Sig Dispense Refill    ELIQUIS 5 MG TABS tablet TAKE 1 TABLET TWICE A  tablet 3    losartan (COZAAR) 25 MG tablet Take 0.5 tablets by mouth daily 45 tablet 3    atenolol (TENORMIN) 50 MG tablet Take 1 tablet by mouth daily 90 tablet 3    calcium carbonate 600 MG TABS tablet Take 1 tablet by mouth daily      levothyroxine (SYNTHROID) 88 MCG tablet Take 88 mcg by mouth Daily.  temazepam (RESTORIL) 7.5 MG capsule Take 7.5 mg by mouth nightly as needed.  diclofenac (VOLTAREN) 50 MG EC tablet Take 50 mg by mouth 3 times daily as needed for Pain (inflammation) (Patient not taking: Reported on 5/12/2022)       No current facility-administered medications for this visit. No Known Allergies    Chief Complaint:  Xiang Meza is here today for follow up and management/recomendations for PAF     History of Present Illness: Xiang Meza states that She does house work, goes up the stairs, & goes shopping. She denies any chest discomfort, dyspnea on exertion, orthopnea/PND. She states that she has chronic lower extremity thickness. She she continues to feel her chronic dizziness.   She states that it never happens when she is laying down. It is predominantly when she is up and walking around. It has not changed. REVIEW OF SYSTEMS:  As above. Patient does not complain of any fever, chills, nausea, vomiting or diarrhea. No focal, motor or neurological deficits. No changes in his/her vision, hearing, bowel or bladder habits. She is not known to have a history of thyroid problems. No recent nose bleeds. PHYSICAL EXAM:  Vitals:    05/12/22 1157   BP: 131/74   Pulse: 77   Resp: 14   Weight: 150 lb 4.8 oz (68.2 kg)   Height: 5' (1.524 m)       GENERAL:  She is alert and oriented x 3, communicates well, in no distress. NECK:  No masses, trachea is mid position. Supple, full ROM, no JVD or bruits. No palpable thyromegaly or lymphadenopathy. HEART:  Regular rate and rhythm with occasional ectopy. Normal S1 and S2. There is an S4 gallop. No abnormal murmur. LUNGS:  Clear to auscultation bilaterally. No use of accessory muscles. symmetrical excursion. ABDOMEN: Soft, non-tender. Normal bowel sounds. EXTREMITIES:  Full ROM x 4. Very thick but no bilateral lower extremity pitting edema. Good distal pulses. EYES:  Extraocular muscles intact. PERRL. Normal lids & conjunctiva. ENT:  Nares are clear & not bleeding. Moist mucosa. Normal lips formation. No external masses   NEURO: no tremors, full ROM x 4, EOMI. SKIN:  Warm, dry and intact. Normal turgor. EKG: Sinus rhythm, 77 bpm, borderline axis, nonspecific ST - T wave changes. Right bundle branch block. Assessment:   1. Paroxysmal atrial fibrillation. maintaining sinus rhythm with PACs today. 2. Also reported prior history of PSVT. 3. Chronic right bundle branch block. 4. Moderate aortic regurgitation  5. Mild to moderate mitral regurgitation. 6. Chronic lower extremity swelling/thickness. Possible lymphedema but she states that she has never been diagnosed with this. 7. Hypercholesterolemia  8.  Atypical tiredness and dizziness. Recommendations:  1. I performed orthostatics on her myself. Lying her blood pressure was 135/75, sitting 144/80, standing 136/72.  2. I reviewed her echo results with her. 3. I also reviewed her monitor with her. 4. Her orthostatics, echo, and monitor are negative for cardiac etiologies for her tiredness and dizziness. I did discuss with her possibly trying support hose even without abnormal orthostatics as above. She states that she has used those in the past and they did not help her lower extremity thickness. I explained that they may help her dizziness if there is an orthostatic component. However, she does drink some caffeine and does not hydrate well. She would like to try hydrating and she would like to see ear nose and throat before trying the support hose. There may be an ear, sinus, or vertigo component. 5. She understands that she should consume 64 ounces of noncaffeinated and nonalcoholic beverages per day. Thank you for allowing me to participate in your patient's care.       [id number], [facility]  Interventional Cardiology

## 2022-06-28 ENCOUNTER — HOSPITAL ENCOUNTER (OUTPATIENT)
Dept: AUDIOLOGY | Age: 77
Discharge: HOME | End: 2022-06-28
Payer: MEDICARE

## 2022-06-28 PROCEDURE — 92653 AEP NEURODIAGNOSTIC I&R: CPT | Performed by: AUDIOLOGIST

## 2022-06-28 PROCEDURE — 92557 COMPREHENSIVE HEARING TEST: CPT | Performed by: AUDIOLOGIST

## 2022-06-28 NOTE — PROGRESS NOTES
BRAINSTEM AUDITORY EVOKED RESPONSE     This patient was referred for a Brainstem Auditory Evoked Response (CECIL) test by Dr Jelena Boland   due to dizziness and giddiness. DESCRIPTION:   The CECIL test was conducted using a Fpz-A1/A2  electrode montage. To elicit the response 80 and 60 dBnHL clicks were presented at a rate of 33.3 per second and averaged over 1000 presentations. At 80 dBnHL, the major components of the waveforms were identified. The left ear repeatability was fair to poor. Aspects of the waveforms (wave V) could be followed down to a level of 60 dBnHL. Waveform morphology was fair . The absolute latencies, interwave latencies and amplitudes were recorded. IMPRESSION:   Brainstem Auditory Evoked Responses were present bilaterally. Waveform morphology was fair to poor. An attempt was made to identify the absolute latencies.   Identified latencies appeared to be symmetrical.              Isabel [address], [address] [facility] [address] [id number]

## 2022-06-28 NOTE — PROGRESS NOTES
AUDIOMETRIC EVALUATION    REASON FOR REFERRAL:  This patient was referred for audiometric testing by Dr Wilmer Fritz due to dizziness and giddiness. RESULTS:  Pure tone audiometric testing using earphones was carried out. Results revealed air conduction thresholds averaging 20 dBHL through 2000 Hz sloping to 85/90 dBHL at 8000 Hz. Speech reception thresholds were obtained at 20 dBHL . Speech discrimination testing was performed at 55 dBHL. Obtained were scores of 100%. (Un)Masked Bone Conduction Testing revealed thresholds equal to air conduction thresholds. IMPRESSION:  Todays results revealed borderline normal hearing through the middle frequencies sloping to a severe hearing loss in the high frequencies. Speech reception thresholds were in agreement with the pure tone averages. Speech discrimination was excellent. An ENT consult is suggested if you feel it is clinically warranted. A re-evaluation is recommended is a change in hearing is noted. The above results were reviewed with the patient. If I can be of further assistance or provide additional information, please do not hesitate to contact this office.       Thank you for the referral.        ___________________________________

## 2022-08-16 ENCOUNTER — HOSPITAL ENCOUNTER (OUTPATIENT)
Dept: MRI IMAGING | Age: 77
Discharge: HOME | End: 2022-08-18
Payer: MEDICARE

## 2022-08-16 DIAGNOSIS — R27.0 ATAXIA: ICD-10-CM

## 2022-08-16 PROCEDURE — 70551 MRI BRAIN STEM W/O DYE: CPT

## 2022-09-15 RX ORDER — ATENOLOL 50 MG/1
50 TABLET ORAL DAILY
Qty: 90 TABLET | Refills: 3 | Status: SHIPPED | OUTPATIENT
Start: 2022-09-15

## 2022-10-13 ENCOUNTER — APPOINTMENT (OUTPATIENT)
Dept: GENERAL RADIOLOGY | Age: 77
End: 2022-10-13
Payer: MEDICARE

## 2022-10-13 ENCOUNTER — HOSPITAL ENCOUNTER (EMERGENCY)
Age: 77
Discharge: HOME | End: 2022-10-13
Attending: EMERGENCY MEDICINE
Payer: MEDICARE

## 2022-10-13 VITALS
HEIGHT: 60 IN | TEMPERATURE: 97.5 F | BODY MASS INDEX: 29.45 KG/M2 | RESPIRATION RATE: 14 BRPM | DIASTOLIC BLOOD PRESSURE: 62 MMHG | OXYGEN SATURATION: 98 % | WEIGHT: 150 LBS | HEART RATE: 65 BPM | SYSTOLIC BLOOD PRESSURE: 137 MMHG

## 2022-10-13 DIAGNOSIS — R42 DIZZINESS: Primary | ICD-10-CM

## 2022-10-13 LAB
ANION GAP SERPL CALCULATED.3IONS-SCNC: 8 MMOL/L (ref 7–16)
BASOPHILS ABSOLUTE: 0.06 E9/L (ref 0–0.2)
BASOPHILS RELATIVE PERCENT: 1.3 % (ref 0–2)
BUN BLDV-MCNC: 17 MG/DL (ref 6–23)
CALCIUM SERPL-MCNC: 8.8 MG/DL (ref 8.6–10.2)
CHLORIDE BLD-SCNC: 107 MMOL/L (ref 98–107)
CO2: 26 MMOL/L (ref 22–29)
CREAT SERPL-MCNC: 0.8 MG/DL (ref 0.5–1)
EOSINOPHILS ABSOLUTE: 0.23 E9/L (ref 0.05–0.5)
EOSINOPHILS RELATIVE PERCENT: 5 % (ref 0–6)
GFR AFRICAN AMERICAN: >60
GFR NON-AFRICAN AMERICAN: >60 ML/MIN/1.73
GLUCOSE BLD-MCNC: 88 MG/DL (ref 74–99)
HCT VFR BLD CALC: 40.4 % (ref 34–48)
HEMOGLOBIN: 13.1 G/DL (ref 11.5–15.5)
IMMATURE GRANULOCYTES #: 0.01 E9/L
IMMATURE GRANULOCYTES %: 0.2 % (ref 0–5)
LYMPHOCYTES ABSOLUTE: 1.12 E9/L (ref 1.5–4)
LYMPHOCYTES RELATIVE PERCENT: 24.5 % (ref 20–42)
MCH RBC QN AUTO: 30.2 PG (ref 26–35)
MCHC RBC AUTO-ENTMCNC: 32.4 % (ref 32–34.5)
MCV RBC AUTO: 93.1 FL (ref 80–99.9)
MONOCYTES ABSOLUTE: 0.57 E9/L (ref 0.1–0.95)
MONOCYTES RELATIVE PERCENT: 12.4 % (ref 2–12)
NEUTROPHILS ABSOLUTE: 2.59 E9/L (ref 1.8–7.3)
NEUTROPHILS RELATIVE PERCENT: 56.6 % (ref 43–80)
PDW BLD-RTO: 13 FL (ref 11.5–15)
PLATELET # BLD: 191 E9/L (ref 130–450)
PMV BLD AUTO: 9.8 FL (ref 7–12)
POTASSIUM REFLEX MAGNESIUM: 4.3 MMOL/L (ref 3.5–5)
RBC # BLD: 4.34 E12/L (ref 3.5–5.5)
SARS-COV-2, NAAT: NOT DETECTED
SODIUM BLD-SCNC: 141 MMOL/L (ref 132–146)
TROPONIN, HIGH SENSITIVITY: 8 NG/L (ref 0–9)
TROPONIN, HIGH SENSITIVITY: 8 NG/L (ref 0–9)
TSH SERPL DL<=0.05 MIU/L-ACNC: 2.4 UIU/ML (ref 0.27–4.2)
WBC # BLD: 4.6 E9/L (ref 4.5–11.5)

## 2022-10-13 PROCEDURE — 93005 ELECTROCARDIOGRAM TRACING: CPT

## 2022-10-13 PROCEDURE — 94664 DEMO&/EVAL PT USE INHALER: CPT

## 2022-10-13 PROCEDURE — 84484 ASSAY OF TROPONIN QUANT: CPT

## 2022-10-13 PROCEDURE — 87635 SARS-COV-2 COVID-19 AMP PRB: CPT

## 2022-10-13 PROCEDURE — 99285 EMERGENCY DEPT VISIT HI MDM: CPT

## 2022-10-13 PROCEDURE — 71045 X-RAY EXAM CHEST 1 VIEW: CPT

## 2022-10-13 PROCEDURE — 84443 ASSAY THYROID STIM HORMONE: CPT

## 2022-10-13 PROCEDURE — 85025 COMPLETE CBC W/AUTO DIFF WBC: CPT

## 2022-10-13 PROCEDURE — 6370000000 HC RX 637 (ALT 250 FOR IP): Performed by: EMERGENCY MEDICINE

## 2022-10-13 PROCEDURE — 80048 BASIC METABOLIC PNL TOTAL CA: CPT

## 2022-10-13 PROCEDURE — 94640 AIRWAY INHALATION TREATMENT: CPT

## 2022-10-13 RX ORDER — MECLIZINE HCL 12.5 MG/1
12.5 TABLET ORAL 3 TIMES DAILY PRN
Qty: 20 TABLET | Refills: 0 | Status: SHIPPED | OUTPATIENT
Start: 2022-10-13 | End: 2022-10-23

## 2022-10-13 RX ORDER — IPRATROPIUM BROMIDE AND ALBUTEROL SULFATE 2.5; .5 MG/3ML; MG/3ML
1 SOLUTION RESPIRATORY (INHALATION) ONCE
Status: COMPLETED | OUTPATIENT
Start: 2022-10-13 | End: 2022-10-13

## 2022-10-13 RX ADMIN — IPRATROPIUM BROMIDE AND ALBUTEROL SULFATE 1 AMPULE: 2.5; .5 SOLUTION RESPIRATORY (INHALATION) at 17:55

## 2022-10-13 ASSESSMENT — PAIN SCALES - GENERAL: PAINLEVEL_OUTOF10: 4

## 2022-10-13 ASSESSMENT — ENCOUNTER SYMPTOMS
VOMITING: 0
DIARRHEA: 0
COUGH: 0
SHORTNESS OF BREATH: 1
BACK PAIN: 0
EYE PAIN: 0
SINUS PAIN: 0
NAUSEA: 0
CONSTIPATION: 0
ABDOMINAL PAIN: 0

## 2022-10-13 ASSESSMENT — PAIN - FUNCTIONAL ASSESSMENT: PAIN_FUNCTIONAL_ASSESSMENT: 0-10

## 2022-10-13 ASSESSMENT — PAIN DESCRIPTION - LOCATION: LOCATION: ABDOMEN

## 2022-10-13 NOTE — ED PROVIDER NOTES
HPI     Patient is a 76 y.o. female presents with a chief complaint of poor coordination and SOB  This has been occurring for poor coordination - months, SOB - 1 day. Patient states that it gets better with rest.  Patient states that it gets worse with exertion. Patient states that it is moderate in severity. Patient states it was gradual in onset. PMH-A. fib, hypothyroidism    Patient presents with chief complaint of shortness of breath and poor coordination. She states that she has had poor coordination with walking for the past several months causing her to bump into walls. She denies any visual disturbances including spinning sensation. She also feels chronically short of breath especially with exertion but last night while on the telephone she had a 30-minute episode of chest tightness and inability to catch her breath causing her to come in today. She recently had an MRI of the brain done which showed no findings. She is currently taking Eliquis for her A. fib. She denies cough, congestion, chest pain, leg swelling. She does not use oxygen at home. She appears in no acute distress during initial visit. Review of Systems   Constitutional:  Negative for chills and fever. HENT:  Negative for ear pain and sinus pain. Eyes:  Negative for pain. Respiratory:  Positive for shortness of breath. Negative for cough. Cardiovascular:  Negative for chest pain. Gastrointestinal:  Negative for abdominal pain, constipation, diarrhea, nausea and vomiting. Genitourinary:  Negative for difficulty urinating, dysuria and flank pain. Musculoskeletal:  Negative for back pain. Skin:  Negative for rash. Neurological:  Positive for dizziness. Negative for weakness, light-headedness and headaches. Psychiatric/Behavioral:  Negative for confusion. Physical Exam  Constitutional:       General: She is not in acute distress. Appearance: Normal appearance. She is not ill-appearing.    HENT: Head: Normocephalic. Right Ear: External ear normal.      Left Ear: External ear normal.      Nose: Nose normal. No congestion or rhinorrhea. Mouth/Throat:      Mouth: Mucous membranes are moist.   Eyes:      Conjunctiva/sclera: Conjunctivae normal.      Pupils: Pupils are equal, round, and reactive to light. Cardiovascular:      Rate and Rhythm: Normal rate and regular rhythm. Pulses: Normal pulses. Pulmonary:      Effort: Pulmonary effort is normal. No respiratory distress. Breath sounds: Normal breath sounds. No stridor. No wheezing or rales. Abdominal:      General: Abdomen is flat. Bowel sounds are normal. There is no distension. Palpations: Abdomen is soft. Tenderness: There is no abdominal tenderness. There is no guarding. Musculoskeletal:         General: No tenderness. Normal range of motion. Cervical back: Normal range of motion and neck supple. Skin:     General: Skin is warm. Findings: No erythema, lesion or rash. Neurological:      General: No focal deficit present. Mental Status: She is oriented to person, place, and time. Sensory: No sensory deficit. Motor: No weakness. Comments: NIH-0, coordination intact while laying in the bed. She did experience some dizziness and spinning sensation with Epley maneuver. Psychiatric:         Behavior: Behavior normal.        Procedures     EKG: This EKG is signed by emergency department physician. Rate: 64  Rhythm: Sinus  Interpretation: no acute changes, no ST or T wave abnormalities, possible incomplete RBBB  Comparison: stable as compared to patient's most recent EKG       MDM       Patient is a 76 y.o. female presenting with poor coordination and shortness of breath. She states that for the past several months she has been bumping into walls and having difficulty with coordination. She initially denied any sort of spinning sensation.   She has had SOB for the past year but felt it was worse last night after she felt chest tightness and difficulty breathing for 30 minutes. A brain MRI around 2 months ago showed no findings. On examination she was in no acute distress. I performed the Epley maneuver and she experienced dizziness and a spinning sensation. Her EKG, CXR and basic labs showed no acute abnormalities. She had a negative troponin and a negative COVID test.  She received a DuoNeb treatment. I informed the patient of these results. I recommended that she follow-up with a neurologist or her primary doctor as soon as possible for further evaluation. I gave her prescription for meclizine because she had not tried that before and the Epley maneuver did produce some spinning sensation. Patient was discharged in stable condition with all of her questions answered. --------------------------------------------- PAST HISTORY ---------------------------------------------  Past Medical History:  has a past medical history of Family history of coronary artery disease, History of PSVT (paroxysmal supraventricular tachycardia), Hyperlipidemia, Hypothyroidism, and RBBB. Past Surgical History:  has a past surgical history that includes Hysterectomy; Cholecystectomy; cyst removal (1995); Liposuction; Colonoscopy (12/2014); Tonsillectomy; and Knee arthroscopy (Left, 05/08/2015). Social History:  reports that she has never smoked. She has never used smokeless tobacco. She reports that she does not drink alcohol and does not use drugs. Family History: family history includes Heart Surgery in her father and mother. The patients home medications have been reviewed. Allergies: Patient has no known allergies.     -------------------------------------------------- RESULTS -------------------------------------------------  Labs:  Results for orders placed or performed during the hospital encounter of 10/13/22   COVID-19, Rapid    Specimen: Nasopharyngeal Swab   Result Value Ref Range    SARS-CoV-2, NAAT Not Detected Not Detected   Troponin   Result Value Ref Range    Troponin, High Sensitivity 8 0 - 9 ng/L   CBC with Auto Differential   Result Value Ref Range    WBC 4.6 4.5 - 11.5 E9/L    RBC 4.34 3.50 - 5.50 E12/L    Hemoglobin 13.1 11.5 - 15.5 g/dL    Hematocrit 40.4 34.0 - 48.0 %    MCV 93.1 80.0 - 99.9 fL    MCH 30.2 26.0 - 35.0 pg    MCHC 32.4 32.0 - 34.5 %    RDW 13.0 11.5 - 15.0 fL    Platelets 948 985 - 456 E9/L    MPV 9.8 7.0 - 12.0 fL    Neutrophils % 56.6 43.0 - 80.0 %    Immature Granulocytes % 0.2 0.0 - 5.0 %    Lymphocytes % 24.5 20.0 - 42.0 %    Monocytes % 12.4 (H) 2.0 - 12.0 %    Eosinophils % 5.0 0.0 - 6.0 %    Basophils % 1.3 0.0 - 2.0 %    Neutrophils Absolute 2.59 1.80 - 7.30 E9/L    Immature Granulocytes # 0.01 E9/L    Lymphocytes Absolute 1.12 (L) 1.50 - 4.00 E9/L    Monocytes Absolute 0.57 0.10 - 0.95 E9/L    Eosinophils Absolute 0.23 0.05 - 0.50 E9/L    Basophils Absolute 0.06 0.00 - 0.20 A5/Z   Basic Metabolic Panel w/ Reflex to MG   Result Value Ref Range    Sodium 141 132 - 146 mmol/L    Potassium reflex Magnesium 4.3 3.5 - 5.0 mmol/L    Chloride 107 98 - 107 mmol/L    CO2 26 22 - 29 mmol/L    Anion Gap 8 7 - 16 mmol/L    Glucose 88 74 - 99 mg/dL    BUN 17 6 - 23 mg/dL    Creatinine 0.8 0.5 - 1.0 mg/dL    GFR Non-African American >60 >=60 mL/min/1.73    GFR African American >60     Calcium 8.8 8.6 - 10.2 mg/dL   TSH   Result Value Ref Range    TSH 2.400 0.270 - 4.200 uIU/mL   Troponin   Result Value Ref Range    Troponin, High Sensitivity 8 0 - 9 ng/L   EKG 12 Lead   Result Value Ref Range    Ventricular Rate 64 BPM    Atrial Rate 64 BPM    P-R Interval 172 ms    QRS Duration 114 ms    Q-T Interval 450 ms    QTc Calculation (Bazett) 464 ms    P Axis 22 degrees    R Axis 30 degrees    T Axis 28 degrees       Radiology:  XR CHEST PORTABLE   Final Result   No active cardiopulmonary disease.             ------------------------- NURSING NOTES AND VITALS REVIEWED ---------------------------  Date / Time Roomed:  10/13/2022  4:06 PM  ED Bed Assignment:  [id number]    The nursing notes within the ED encounter and vital signs as below have been reviewed. /62   Pulse 65   Temp 97.5 °F (36.4 °C) (Oral)   Resp 14   Ht 5' (1.524 m)   Wt 150 lb (68 kg)   SpO2 98%   BMI 29.29 kg/m²   Oxygen Saturation Interpretation: Normal      ------------------------------------------ PROGRESS NOTES ------------------------------------------  10:16 PM EDT  I have spoken with the patient and discussed todays results, in addition to providing specific details for the plan of care and counseling regarding the diagnosis and prognosis. Their questions are answered at this time and they are agreeable with the plan. I discussed at length with them reasons for immediate return here for re evaluation. They will followup with their primary care physician by calling their office tomorrow. --------------------------------- ADDITIONAL PROVIDER NOTES ---------------------------------  At this time the patient is without objective evidence of an acute process requiring hospitalization or inpatient management. They have remained hemodynamically stable throughout their entire ED visit and are stable for discharge with outpatient follow-up. The plan has been discussed in detail and they are aware of the specific conditions for emergent return, as well as the importance of follow-up. Discharge Medication List as of 10/13/2022  8:12 PM        START taking these medications    Details   meclizine (ANTIVERT) 12.5 MG tablet Take 1 tablet by mouth 3 times daily as needed for Dizziness, Disp-20 tablet, R-0Print             Diagnosis:  1. Dizziness        Disposition:  Patient's disposition: Discharge to home  Patient's condition is stable. Patient was given return precautions. Labs were interpreted by me. Patient will follow up with their primary care provider.  Patient is agreeable to this plan. Patient has remained stable throughout their stay in the ED. Patient was seen and evaluated by myself and my attending Geovanni Cook MD. Assessment and Plan discussed with attending provider, please see attestation for final plan of care. This note was done using dictation software and there may be some grammatical errors associated with this.     DO Jordon Torres DO  Resident  10/13/22 [id number]

## 2022-10-14 LAB
EKG ATRIAL RATE: 64 BPM
EKG P AXIS: 22 DEGREES
EKG P-R INTERVAL: 172 MS
EKG Q-T INTERVAL: 450 MS
EKG QRS DURATION: 114 MS
EKG QTC CALCULATION (BAZETT): 464 MS
EKG R AXIS: 30 DEGREES
EKG T AXIS: 28 DEGREES
EKG VENTRICULAR RATE: 64 BPM

## 2022-11-29 ENCOUNTER — FOLLOWUP TELEPHONE ENCOUNTER (OUTPATIENT)
Dept: AUDIOLOGY | Age: 77
End: 2022-11-29

## 2022-11-29 NOTE — TELEPHONE ENCOUNTER
Dr. Elidia Sanchez office called. Did not receive fax. His fax number in epic is incorrect. Faxed to correct number via epic. They will call back if do not receive results.      Electronically signed by Roberta Wiggins on 11/29/2022 at 3:15 PM

## 2022-12-20 ENCOUNTER — TELEPHONE (OUTPATIENT)
Dept: AUDIOLOGY | Age: 77
End: 2022-12-20

## 2022-12-20 NOTE — TELEPHONE ENCOUNTER
Referral for ABR received with a diagnosis of vertigo. Called ordering physician's office to see if this is the test they wanted as this is not typically ordered for vertigo. Also noticed ABR was completed in June 2022. Will talk to physician's office staff when they call back about this as well.      Electronically signed by Mariely Maxwell on 12/20/2022 at 10:37 AM

## 2022-12-20 NOTE — TELEPHONE ENCOUNTER
Per pcp voicemail ok to disregard order for CECIL testing. Will scan. No other issues noted.    Electronically signed by Mariely Templeton on 12/20/2022 at 3:45 PM

## 2023-03-27 RX ORDER — APIXABAN 5 MG/1
TABLET, FILM COATED ORAL
Qty: 180 TABLET | Refills: 3 | Status: SHIPPED | OUTPATIENT
Start: 2023-03-27

## 2023-11-21 ENCOUNTER — TELEPHONE (OUTPATIENT)
Dept: CARDIOLOGY CLINIC | Age: 78
End: 2023-11-21

## 2023-11-21 ENCOUNTER — OFFICE VISIT (OUTPATIENT)
Dept: CARDIOLOGY CLINIC | Age: 78
End: 2023-11-21

## 2023-11-21 VITALS
HEART RATE: 75 BPM | WEIGHT: 146 LBS | SYSTOLIC BLOOD PRESSURE: 130 MMHG | DIASTOLIC BLOOD PRESSURE: 76 MMHG | RESPIRATION RATE: 18 BRPM | BODY MASS INDEX: 28.66 KG/M2 | HEIGHT: 60 IN

## 2023-11-21 DIAGNOSIS — R06.09 DOE (DYSPNEA ON EXERTION): Primary | ICD-10-CM

## 2023-11-21 DIAGNOSIS — I48.0 PAROXYSMAL ATRIAL FIBRILLATION (HCC): Primary | ICD-10-CM

## 2023-11-21 DIAGNOSIS — R06.09 DOE (DYSPNEA ON EXERTION): ICD-10-CM

## 2023-11-21 DIAGNOSIS — I35.1 NONRHEUMATIC AORTIC VALVE INSUFFICIENCY: ICD-10-CM

## 2023-11-21 DIAGNOSIS — I34.0 NONRHEUMATIC MITRAL VALVE REGURGITATION: ICD-10-CM

## 2023-11-21 RX ORDER — METOPROLOL TARTRATE 50 MG/1
1 TABLET, FILM COATED ORAL 2 TIMES DAILY
COMMUNITY
Start: 2023-09-07

## 2023-11-21 NOTE — TELEPHONE ENCOUNTER
Per Dr. Agustin Rosenberg, patient needs PFTs re: FLEMING. MDCR - no auth required. Patient notified. Patient given [facility] phone number to call to schedule around her availability.

## 2023-11-21 NOTE — PROGRESS NOTES
Ondina Bapquang  1945  Date of Service: 11/21/2023    Patient Active Problem List    Diagnosis Date Noted    Abnormal nuclear stress test 12/13/2013     Priority: Medium    History of PSVT (paroxysmal supraventricular tachycardia)      Priority: Medium    Hyperlipidemia      Priority: Medium    Hypothyroidism 12/13/2013     Priority: Low    Family history of premature CAD 12/13/2013     Priority: Low     Overview Note:     1. Both parents had MI in their 63's      Obesity (BMI 30.0-34.9) 12/13/2013     Priority: Low    RBBB      Priority: Low    Nonrheumatic mitral valve regurgitation 11/09/2021     Overview Note:     Mild to moderate      Nonrheumatic aortic valve insufficiency 11/09/2021     Overview Note:     Moderate      Pes anserine bursitis 05/31/2017    Paroxysmal atrial fibrillation ([address]) 02/16/2017    Vasovagal syncope 02/16/2017    Chronic anticoagulation 02/16/2017       Social History     Socioeconomic History    Marital status:       Spouse name: None    Number of children: None    Years of education: None    Highest education level: None   Tobacco Use    Smoking status: Never    Smokeless tobacco: Never   Vaping Use    Vaping Use: Never used   Substance and Sexual Activity    Alcohol use: No    Drug use: No       Current Outpatient Medications   Medication Sig Dispense Refill    metoprolol tartrate (LOPRESSOR) 50 MG tablet Take 1 tablet by mouth in the morning and at bedtime      ELIQUIS 5 MG TABS tablet TAKE 1 TABLET TWICE A  tablet 3    calcium carbonate 600 MG TABS tablet Take 1 tablet by mouth daily      levothyroxine (SYNTHROID) 88 MCG tablet Take 1 tablet by mouth Daily      atenolol (TENORMIN) 50 MG tablet Take 1 tablet by mouth daily (Patient not taking: Reported on 11/21/2023) 90 tablet 3    losartan (COZAAR) 25 MG tablet Take 0.5 tablets by mouth daily (Patient not taking: Reported on 11/21/2023) 45 tablet 3    atenolol (TENORMIN) 50 MG tablet Take 1 tablet by mouth

## 2023-11-24 ENCOUNTER — TELEPHONE (OUTPATIENT)
Dept: CARDIOLOGY | Age: 78
End: 2023-11-24

## 2023-11-28 ENCOUNTER — TELEPHONE (OUTPATIENT)
Dept: CARDIOLOGY CLINIC | Age: 78
End: 2023-11-28

## 2023-11-28 ENCOUNTER — HOSPITAL ENCOUNTER (OUTPATIENT)
Dept: PULMONOLOGY | Age: 78
Discharge: HOME | End: 2023-11-28
Attending: INTERNAL MEDICINE
Payer: MEDICARE

## 2023-11-28 DIAGNOSIS — R06.09 DOE (DYSPNEA ON EXERTION): ICD-10-CM

## 2023-11-28 PROCEDURE — 94729 DIFFUSING CAPACITY: CPT

## 2023-11-28 PROCEDURE — 94060 EVALUATION OF WHEEZING: CPT

## 2023-11-28 PROCEDURE — 94726 PLETHYSMOGRAPHY LUNG VOLUMES: CPT

## 2023-11-29 NOTE — PROCEDURES
[address]                  [address] [facility], [address] [place] [address]                               PULMONARY FUNCTION    PATIENT NAME: Robina Valdez                     :        1945  MED REC NO:   [id number]                            ROOM:  ACCOUNT NO:   [de-identified]                           ADMIT DATE: 2023  PROVIDER:     Preethi Kc MD    DATE OF PROCEDURE:  2023    SERVICE:  Pulmonary and [facility]. ORDERING PROVIDER:  Blake Madden DO.    INTERPRETING PROVIDER:  Preethi Kc MD.    PROCEDURE PERFORMED:  Pulmonary function tests. PURPOSE:  Diagnostic. FINDINGS:  LUNG MECHANICS:  There appears to be a combined obstructive and  restrictive component of lung disease according to graphic and numeric  representation of the patient's lung volumes. FEV1/FVC is 0.57, most notably at the postbronchodilator effect. FEV1  is 0.90 liters, 53% of predicted, postbronchodilator equating to a 160  mL difference or 20% change. Z-score is -2.49. FVC is 1.56 liters, 71%  of predicted, postbronchodilator equating to a 70 mL difference or 5%  change. Z-score is -1.55. MVV is 32% of predicted. Inspiratory phase of the flow-volume loop shows adequate upper airway,  laryngeal, and pharyngeal function. Expiratory phase of the flow-volume  loop shows hyperdynamic collapse in the large and the small distal  peripheral airways. LUNG VOLUMES:  Lung volumes are mildly reduced at the SVC and the IC and  severely reduced at the ERV, which is at 20%. All other parameters are  within normal limits. LUNG DIFFUSION:  Diffusion capacity corrected for alveolar volume is  normal at 95%. CLINICAL IMPRESSION:  Clinically most compatible with active large and  small airway asthma. There is an obstructive component of lung disease,  which also reflects this, and a mild restrictive component of lung  disease as well.   Z-score most

## 2023-11-29 NOTE — TELEPHONE ENCOUNTER
Hubert Rucker, DO Ino RON  There are abnormal findings on her lungs with this test.  She may need to see a pulmonologist.  I will defer to her PCP. Please follow-up on this with PCP. Please forward this to her PCP and have her follow-up on this with her PCP.

## 2023-11-29 NOTE — TELEPHONE ENCOUNTER
Patient notified of PFT results and Dr. Ray Zhang recommendation. Note and PFTs faxed to Dr. Jenifer Ash  [phone].

## 2024-01-24 ENCOUNTER — HOSPITAL ENCOUNTER (OUTPATIENT)
Dept: CARDIOLOGY | Age: 79
Discharge: HOME | End: 2024-01-26
Attending: INTERNAL MEDICINE
Payer: MEDICARE

## 2024-01-24 VITALS
WEIGHT: 146 LBS | BODY MASS INDEX: 28.66 KG/M2 | DIASTOLIC BLOOD PRESSURE: 76 MMHG | SYSTOLIC BLOOD PRESSURE: 130 MMHG | HEIGHT: 60 IN

## 2024-01-24 DIAGNOSIS — I34.0 NONRHEUMATIC MITRAL VALVE REGURGITATION: ICD-10-CM

## 2024-01-24 DIAGNOSIS — R06.09 DOE (DYSPNEA ON EXERTION): ICD-10-CM

## 2024-01-24 DIAGNOSIS — I35.1 NONRHEUMATIC AORTIC VALVE INSUFFICIENCY: ICD-10-CM

## 2024-01-24 PROCEDURE — 93306 TTE W/DOPPLER COMPLETE: CPT

## 2024-01-25 LAB
ECHO AO ASC DIAM: 2.9 CM
ECHO AO ASCENDING AORTA INDEX: 1.78 CM/M2
ECHO AR MAX VEL PISA: 4.4 M/S
ECHO AV AREA PEAK VELOCITY: 2.2 CM2
ECHO AV AREA VTI: 2.3 CM2
ECHO AV AREA/BSA PEAK VELOCITY: 1.3 CM2/M2
ECHO AV AREA/BSA VTI: 1.4 CM2/M2
ECHO AV CUSP MM: 2 CM
ECHO AV MEAN GRADIENT: 4 MMHG
ECHO AV MEAN VELOCITY: 0.9 M/S
ECHO AV PEAK GRADIENT: 8 MMHG
ECHO AV PEAK VELOCITY: 1.4 M/S
ECHO AV REGURGITANT PHT: 548.5 MILLISECOND
ECHO AV VELOCITY RATIO: 0.71
ECHO AV VTI: 29.5 CM
ECHO BSA: 1.67 M2
ECHO EST RA PRESSURE: 3 MMHG
ECHO LA DIAMETER INDEX: 1.84 CM/M2
ECHO LA DIAMETER: 3 CM
ECHO LA VOL A-L A2C: 35 ML (ref 22–52)
ECHO LA VOL A-L A4C: 30 ML (ref 22–52)
ECHO LA VOL MOD A2C: 32 ML (ref 22–52)
ECHO LA VOL MOD A4C: 26 ML (ref 22–52)
ECHO LA VOLUME AREA LENGTH: 34 ML
ECHO LA VOLUME INDEX A-L A2C: 21 ML/M2 (ref 16–34)
ECHO LA VOLUME INDEX A-L A4C: 18 ML/M2 (ref 16–34)
ECHO LA VOLUME INDEX AREA LENGTH: 21 ML/M2 (ref 16–34)
ECHO LA VOLUME INDEX MOD A2C: 20 ML/M2 (ref 16–34)
ECHO LA VOLUME INDEX MOD A4C: 16 ML/M2 (ref 16–34)
ECHO LV EF PHYSICIAN: 65 %
ECHO LV FRACTIONAL SHORTENING: 32 % (ref 28–44)
ECHO LV INTERNAL DIMENSION DIASTOLE INDEX: 2.33 CM/M2
ECHO LV INTERNAL DIMENSION DIASTOLIC: 3.8 CM (ref 3.9–5.3)
ECHO LV INTERNAL DIMENSION SYSTOLIC INDEX: 1.6 CM/M2
ECHO LV INTERNAL DIMENSION SYSTOLIC: 2.6 CM
ECHO LV IVSD: 1.2 CM (ref 0.6–0.9)
ECHO LV IVSS: 1.6 CM
ECHO LV MASS 2D: 125.8 G (ref 67–162)
ECHO LV MASS INDEX 2D: 77.2 G/M2 (ref 43–95)
ECHO LV POSTERIOR WALL DIASTOLIC: 0.9 CM (ref 0.6–0.9)
ECHO LV POSTERIOR WALL SYSTOLIC: 1.4 CM
ECHO LV RELATIVE WALL THICKNESS RATIO: 0.47
ECHO LVOT AREA: 3.1 CM2
ECHO LVOT AV VTI INDEX: 0.72
ECHO LVOT DIAM: 2 CM
ECHO LVOT MEAN GRADIENT: 2 MMHG
ECHO LVOT PEAK GRADIENT: 4 MMHG
ECHO LVOT PEAK VELOCITY: 1 M/S
ECHO LVOT STROKE VOLUME INDEX: 40.8 ML/M2
ECHO LVOT SV: 66.6 ML
ECHO LVOT VTI: 21.2 CM
ECHO MV "A" WAVE DURATION: 124.6 MSEC
ECHO MV A VELOCITY: 1.03 M/S
ECHO MV AREA PHT: 2.2 CM2
ECHO MV AREA VTI: 2 CM2
ECHO MV E DECELERATION TIME (DT): 289.3 MS
ECHO MV E VELOCITY: 0.82 M/S
ECHO MV E/A RATIO: 0.8
ECHO MV LVOT VTI INDEX: 1.56
ECHO MV MAX VELOCITY: 1.2 M/S
ECHO MV MEAN GRADIENT: 2 MMHG
ECHO MV MEAN VELOCITY: 0.6 M/S
ECHO MV PEAK GRADIENT: 5 MMHG
ECHO MV PRESSURE HALF TIME (PHT): 98.4 MS
ECHO MV VTI: 33 CM
ECHO PULMONARY ARTERY END DIASTOLIC PRESSURE: 9 MMHG
ECHO PV MAX VELOCITY: 0.8 M/S
ECHO PV MEAN GRADIENT: 1 MMHG
ECHO PV MEAN VELOCITY: 0.6 M/S
ECHO PV PEAK GRADIENT: 2 MMHG
ECHO PV REGURGITANT MAX VELOCITY: 1.5 M/S
ECHO PV VTI: 16.3 CM
ECHO RIGHT VENTRICULAR SYSTOLIC PRESSURE (RVSP): 29 MMHG
ECHO RV INTERNAL DIMENSION: 3.1 CM
ECHO TV REGURGITANT MAX VELOCITY: 2.56 M/S
ECHO TV REGURGITANT PEAK GRADIENT: 26 MMHG

## 2024-01-29 ENCOUNTER — TELEPHONE (OUTPATIENT)
Dept: CARDIOLOGY CLINIC | Age: 79
End: 2024-01-29

## 2024-01-29 NOTE — TELEPHONE ENCOUNTER
----- Message from Shana Hansen sent at 1/29/2024  9:38 AM EST -----    ----- Message -----  From: Basim Duque DO  Sent: 1/29/2024   8:57 AM EST  To: Shana Hansen    Let her know that the heart function is good.  The moderate aortic regurgitation is only mild to moderate and moderate mitral regurgitation is only mild on this echo.  No changes.        ----- Message -----   From: Basim Duque DO   Sent: 1/29/2024   8:58 AM EST   To: Shana Hansen     PS.  Nothing on the echo to explain any symptoms.

## 2024-05-28 RX ORDER — APIXABAN 5 MG/1
TABLET, FILM COATED ORAL
Qty: 180 TABLET | Refills: 3 | Status: SHIPPED | OUTPATIENT
Start: 2024-05-28

## 2024-07-23 ENCOUNTER — TELEPHONE (OUTPATIENT)
Dept: ADMINISTRATIVE | Age: 79
End: 2024-07-23

## 2024-07-23 NOTE — TELEPHONE ENCOUNTER
Patient notified of Dr. Duque's recommendations.  She has already been seen by ENT and Neurology.  EKG and orthostatics scheduled for 7/24/24 at 2:40 p.m.  F/U scheduled for 9/6/24 at 10:40 a.m.

## 2024-07-23 NOTE — TELEPHONE ENCOUNTER
I recommend that she sees ENT and neurology if she has not seen them.  ECG and orthostatics.  Echo 1/24/2024 does not demonstrate any cardiac etiologies for disequilibrium.  Office visit next available.

## 2024-07-23 NOTE — TELEPHONE ENCOUNTER
Pt called to schedule an appt with Dr. Duque.  She has no equilibrium and is staggering.  She has seen multiple specialists, and they have been unable to determine what is causing it.  Staff unavailable due to pt care.  Please contact pt. She can be contacted after 1:00 today.

## 2024-07-24 ENCOUNTER — TELEPHONE (OUTPATIENT)
Dept: CARDIOLOGY CLINIC | Age: 79
End: 2024-07-24

## 2024-07-24 ENCOUNTER — NURSE ONLY (OUTPATIENT)
Dept: CARDIOLOGY CLINIC | Age: 79
End: 2024-07-24
Payer: MEDICARE

## 2024-07-24 DIAGNOSIS — I48.0 PAROXYSMAL ATRIAL FIBRILLATION (HCC): Primary | ICD-10-CM

## 2024-07-24 PROCEDURE — 93000 ELECTROCARDIOGRAM COMPLETE: CPT | Performed by: INTERNAL MEDICINE

## 2024-07-24 NOTE — TELEPHONE ENCOUNTER
Basim Duque, Shana Patel  Let her know that she has some extra heartbeats but she is staying in normal rhythm.

## 2024-07-24 NOTE — PROGRESS NOTES
Patient was seen in the office today for a BP/ P check per Dr. Duque.        Sitting BP: 122/72      Sitting P:71        Standing BP:118/70    Standing P:79        Laying BP: 116/70    Laying P:68        Akila Courtney MA

## 2024-07-24 NOTE — TELEPHONE ENCOUNTER
Patient was seen in the office today for EKG and Orthostatics per Dr. Duque.           Sitting BP: 122/72        Sitting P:71           Standing BP:118/70     Standing P:79           Laying BP: 116/70     Laying P:68           Akila Courtney MA     · Continue amlodipine 5 mg daily, hold for systolic blood pressure below 130  · Continue to hold losartan and Lasix, restart if indicated

## 2024-10-21 ENCOUNTER — INITIAL CONSULT (OUTPATIENT)
Dept: GERIATRIC MEDICINE | Age: 79
End: 2024-10-21

## 2024-10-21 VITALS
DIASTOLIC BLOOD PRESSURE: 76 MMHG | SYSTOLIC BLOOD PRESSURE: 126 MMHG | HEART RATE: 60 BPM | WEIGHT: 144.4 LBS | TEMPERATURE: 98 F | BODY MASS INDEX: 28.2 KG/M2 | RESPIRATION RATE: 20 BRPM

## 2024-10-21 DIAGNOSIS — I48.0 PAROXYSMAL ATRIAL FIBRILLATION (HCC): ICD-10-CM

## 2024-10-21 DIAGNOSIS — G62.9 PERIPHERAL POLYNEUROPATHY: Primary | ICD-10-CM

## 2024-10-21 RX ORDER — TEMAZEPAM 7.5 MG/1
7.5 CAPSULE ORAL NIGHTLY
COMMUNITY

## 2024-10-21 RX ORDER — LEVOTHYROXINE SODIUM 100 UG/1
100 TABLET ORAL DAILY
COMMUNITY

## 2024-10-21 RX ORDER — VITAMIN E 268 MG
400 CAPSULE ORAL DAILY
COMMUNITY

## 2024-10-21 SDOH — ECONOMIC STABILITY: FOOD INSECURITY: WITHIN THE PAST 12 MONTHS, YOU WORRIED THAT YOUR FOOD WOULD RUN OUT BEFORE YOU GOT MONEY TO BUY MORE.: NEVER TRUE

## 2024-10-21 SDOH — ECONOMIC STABILITY: FOOD INSECURITY: WITHIN THE PAST 12 MONTHS, THE FOOD YOU BOUGHT JUST DIDN'T LAST AND YOU DIDN'T HAVE MONEY TO GET MORE.: NEVER TRUE

## 2024-10-21 SDOH — ECONOMIC STABILITY: INCOME INSECURITY: HOW HARD IS IT FOR YOU TO PAY FOR THE VERY BASICS LIKE FOOD, HOUSING, MEDICAL CARE, AND HEATING?: NOT HARD AT ALL

## 2024-10-21 ASSESSMENT — PATIENT HEALTH QUESTIONNAIRE - PHQ9
SUM OF ALL RESPONSES TO PHQ9 QUESTIONS 1 & 2: 0
1. LITTLE INTEREST OR PLEASURE IN DOING THINGS: NOT AT ALL
SUM OF ALL RESPONSES TO PHQ QUESTIONS 1-9: 0
2. FEELING DOWN, DEPRESSED OR HOPELESS: NOT AT ALL

## 2024-10-21 NOTE — PROGRESS NOTES
Chief Complaint   Patient presents with    Consultation     Referral from PCP, Dr Pruitt for \"geriatric assessment\".  Pt here alone, states she requested the referral because she has \"bad equilibrium problems\" for at least 3 years.  Pt states sometimes she needs to use a wheelchair because of it.       Dr Francisco notified of above.

## 2024-10-21 NOTE — PROGRESS NOTES
CC Several years of balance issues after COVID shot  Complete Neurologic work up at [facility]  Meds reviewed and no OH  Has not fallen  [place] ancestry with normal B 12  Lived on [place] and knew [place]  [facility] 59   Knew Jason Walsh   No PN and no OH   No PD findings and negative ROS  Had father with Ess tremor and likewise brother   and   in 60's with sudden death  Neuro , Cerebellar exam negative  Imagery in [place] normal  Question of Cervical spine disease and followed in [facility]  No vertigo or LH per exam  TM's negative   Gait neg for PD  And intermitten jerk causing imbalance  Impression; Probable Cervical vertigo                      AFib and Gait ,balance issues  Plan: Cpmplete workup in [place]            And later C Spine measures and /or PT

## 2024-10-28 NOTE — PATIENT INSTRUCTIONS
Please call us at [phone] to schedule your next appointment after you have been seen at the [facility].  Thank you.

## 2024-12-12 ENCOUNTER — OFFICE VISIT (OUTPATIENT)
Dept: CARDIOLOGY CLINIC | Age: 79
End: 2024-12-12

## 2024-12-12 VITALS
RESPIRATION RATE: 18 BRPM | HEIGHT: 60 IN | BODY MASS INDEX: 27.86 KG/M2 | WEIGHT: 141.9 LBS | OXYGEN SATURATION: 96 % | TEMPERATURE: 97.5 F | DIASTOLIC BLOOD PRESSURE: 62 MMHG | HEART RATE: 80 BPM | SYSTOLIC BLOOD PRESSURE: 118 MMHG

## 2024-12-12 DIAGNOSIS — I34.0 NONRHEUMATIC MITRAL VALVE REGURGITATION: ICD-10-CM

## 2024-12-12 DIAGNOSIS — I48.0 PAROXYSMAL ATRIAL FIBRILLATION (HCC): Primary | ICD-10-CM

## 2024-12-12 DIAGNOSIS — I35.1 NONRHEUMATIC AORTIC VALVE INSUFFICIENCY: ICD-10-CM

## 2024-12-12 NOTE — PROGRESS NOTES
400 UNIT capsule Take 1 capsule by mouth daily      ALBUTEROL IN Inhale into the lungs as needed      Multiple Vitamins-Minerals (WOMENS 50+ MULTI VITAMIN/MIN) TABS Take 1 tablet by mouth daily      temazepam (RESTORIL) 7.5 MG capsule Take 1 capsule by mouth at bedtime.      levothyroxine (SYNTHROID) 100 MCG tablet Take 1 tablet by mouth Daily      ELIQUIS 5 MG TABS tablet TAKE 1 TABLET TWICE A  tablet 3    metoprolol tartrate (LOPRESSOR) 50 MG tablet Take 1 tablet by mouth in the morning and at bedtime      calcium carbonate 600 MG TABS tablet Take 1 tablet by mouth three times a week       No current facility-administered medications for this visit.        No Known Allergies    Chief Complaint:  Breonna Shipley is here today for follow up and management/recomendations for PAF     History of Present Illness: Breonna Shipley states that She does house work, goes up the stairs, & goes shopping.  She continues to feel her dyspnea on exertion that she has felt for several years.  She had PFTs performed which were abnormal for lung disease.  She did see pulmonology.  She states that they started her on inhalers.  She did not feel that they were helping so she never followed up with pulmonology.  She also has chronic/recurring sinus congestion with postnasal drip and a cough.  She continues to complain of loss of balance.  She insists that this is not lightheadedness or dizziness but she just frequently stumbles and has trouble with balance all day.  She denies any chest discomfort, orthopnea/PND.  She states that she has chronic lower extremity thickness.     REVIEW OF SYSTEMS:  As above. Patient does not complain of any fever, chills, nausea, vomiting or diarrhea. No focal, motor or neurological deficits. No changes in his/her vision, hearing, bowel or bladder habits.  She is not known to have a history of thyroid problems.  No recent nose bleeds.    PHYSICAL EXAM:  Vitals:    12/12/24 1440   BP: 118/62   Pulse:

## 2025-01-29 ENCOUNTER — HOSPITAL ENCOUNTER (OUTPATIENT)
Dept: CARDIOLOGY | Age: 80
Discharge: HOME | End: 2025-01-31
Attending: INTERNAL MEDICINE
Payer: MEDICARE

## 2025-01-29 VITALS
WEIGHT: 144 LBS | DIASTOLIC BLOOD PRESSURE: 62 MMHG | BODY MASS INDEX: 28.27 KG/M2 | SYSTOLIC BLOOD PRESSURE: 118 MMHG | HEIGHT: 60 IN

## 2025-01-29 DIAGNOSIS — I48.0 PAROXYSMAL ATRIAL FIBRILLATION (HCC): ICD-10-CM

## 2025-01-29 DIAGNOSIS — I35.1 NONRHEUMATIC AORTIC VALVE INSUFFICIENCY: ICD-10-CM

## 2025-01-29 DIAGNOSIS — I34.0 NONRHEUMATIC MITRAL VALVE REGURGITATION: ICD-10-CM

## 2025-01-29 LAB
ECHO AO ASC DIAM: 2.9 CM
ECHO AO ASCENDING AORTA INDEX: 1.79 CM/M2
ECHO AR MAX VEL PISA: 4.4 M/S
ECHO AV AREA PEAK VELOCITY: 2.1 CM2
ECHO AV AREA VTI: 2.2 CM2
ECHO AV AREA/BSA PEAK VELOCITY: 1.3 CM2/M2
ECHO AV AREA/BSA VTI: 1.4 CM2/M2
ECHO AV CUSP MM: 2.2 CM
ECHO AV MEAN GRADIENT: 4 MMHG
ECHO AV MEAN VELOCITY: 0.9 M/S
ECHO AV PEAK GRADIENT: 8 MMHG
ECHO AV PEAK VELOCITY: 1.5 M/S
ECHO AV REGURGITANT PHT: 414.9 MS
ECHO AV VELOCITY RATIO: 0.73
ECHO AV VTI: 30 CM
ECHO BSA: 1.66 M2
ECHO EST RA PRESSURE: 3 MMHG
ECHO LA DIAMETER INDEX: 1.85 CM/M2
ECHO LA DIAMETER: 3 CM
ECHO LA VOL A-L A2C: 33 ML (ref 22–52)
ECHO LA VOL A-L A4C: 21 ML (ref 22–52)
ECHO LA VOL MOD A2C: 32 ML (ref 22–52)
ECHO LA VOL MOD A4C: 18 ML (ref 22–52)
ECHO LA VOLUME AREA LENGTH: 28 ML
ECHO LA VOLUME INDEX A-L A2C: 20 ML/M2 (ref 16–34)
ECHO LA VOLUME INDEX A-L A4C: 13 ML/M2 (ref 16–34)
ECHO LA VOLUME INDEX AREA LENGTH: 17 ML/M2 (ref 16–34)
ECHO LA VOLUME INDEX MOD A2C: 20 ML/M2 (ref 16–34)
ECHO LA VOLUME INDEX MOD A4C: 11 ML/M2 (ref 16–34)
ECHO LV EF PHYSICIAN: 65 %
ECHO LV FRACTIONAL SHORTENING: 40 % (ref 28–44)
ECHO LV INTERNAL DIMENSION DIASTOLE INDEX: 2.65 CM/M2
ECHO LV INTERNAL DIMENSION DIASTOLIC: 4.3 CM (ref 3.9–5.3)
ECHO LV INTERNAL DIMENSION SYSTOLIC INDEX: 1.6 CM/M2
ECHO LV INTERNAL DIMENSION SYSTOLIC: 2.6 CM
ECHO LV IVSD: 0.7 CM (ref 0.6–0.9)
ECHO LV IVSS: 1 CM
ECHO LV MASS 2D: 88.5 G (ref 67–162)
ECHO LV MASS INDEX 2D: 54.6 G/M2 (ref 43–95)
ECHO LV POSTERIOR WALL DIASTOLIC: 0.7 CM (ref 0.6–0.9)
ECHO LV POSTERIOR WALL SYSTOLIC: 1 CM
ECHO LV RELATIVE WALL THICKNESS RATIO: 0.33
ECHO LVOT AREA: 2.8 CM2
ECHO LVOT AV VTI INDEX: 0.74
ECHO LVOT DIAM: 1.9 CM
ECHO LVOT MEAN GRADIENT: 2 MMHG
ECHO LVOT PEAK GRADIENT: 4 MMHG
ECHO LVOT PEAK VELOCITY: 1.1 M/S
ECHO LVOT STROKE VOLUME INDEX: 39 ML/M2
ECHO LVOT SV: 63.2 ML
ECHO LVOT VTI: 22.3 CM
ECHO MV "A" WAVE DURATION: 94.6 MSEC
ECHO MV A VELOCITY: 0.92 M/S
ECHO MV AREA PHT: 2.4 CM2
ECHO MV AREA VTI: 1.7 CM2
ECHO MV E DECELERATION TIME (DT): 248.9 MS
ECHO MV E VELOCITY: 0.99 M/S
ECHO MV E/A RATIO: 1.08
ECHO MV LVOT VTI INDEX: 1.63
ECHO MV MAX VELOCITY: 1.1 M/S
ECHO MV MEAN GRADIENT: 2 MMHG
ECHO MV MEAN VELOCITY: 0.6 M/S
ECHO MV PEAK GRADIENT: 5 MMHG
ECHO MV PRESSURE HALF TIME (PHT): 89.9 MS
ECHO MV VTI: 36.3 CM
ECHO PV MAX VELOCITY: 0.8 M/S
ECHO PV MEAN GRADIENT: 1 MMHG
ECHO PV MEAN VELOCITY: 0.5 M/S
ECHO PV PEAK GRADIENT: 2 MMHG
ECHO PV VTI: 14.1 CM
ECHO PVEIN A DURATION: 76.1 MS
ECHO PVEIN A VELOCITY: 0.2 M/S
ECHO PVEIN PEAK D VELOCITY: 0.5 M/S
ECHO PVEIN PEAK S VELOCITY: 0.5 M/S
ECHO PVEIN S/D RATIO: 1
ECHO RIGHT VENTRICULAR SYSTOLIC PRESSURE (RVSP): 29 MMHG
ECHO RV INTERNAL DIMENSION: 1.9 CM
ECHO TV REGURGITANT MAX VELOCITY: 2.57 M/S
ECHO TV REGURGITANT PEAK GRADIENT: 27 MMHG

## 2025-01-29 PROCEDURE — 93306 TTE W/DOPPLER COMPLETE: CPT

## 2025-02-05 ENCOUNTER — TELEPHONE (OUTPATIENT)
Dept: CARDIOLOGY CLINIC | Age: 80
End: 2025-02-05

## 2025-02-05 NOTE — TELEPHONE ENCOUNTER
----- Message from Dr. Basim Duque, DO sent at 2/4/2025  5:27 PM EST -----  Let her know that the heart function is good.  The mild aortic regurgitation is mild to moderate which is similar and the mild to moderate mitral regurgitation is mild on this echo.  Overall not much change.

## 2025-06-13 RX ORDER — APIXABAN 5 MG/1
5 TABLET, FILM COATED ORAL 2 TIMES DAILY
Qty: 180 TABLET | Refills: 3 | Status: SHIPPED | OUTPATIENT
Start: 2025-06-13